# Patient Record
Sex: FEMALE | Race: WHITE | NOT HISPANIC OR LATINO | Employment: UNEMPLOYED | URBAN - METROPOLITAN AREA
[De-identification: names, ages, dates, MRNs, and addresses within clinical notes are randomized per-mention and may not be internally consistent; named-entity substitution may affect disease eponyms.]

---

## 2017-07-18 ENCOUNTER — HOSPITAL ENCOUNTER (EMERGENCY)
Facility: HOSPITAL | Age: 32
Discharge: HOME | End: 2017-07-18
Attending: FAMILY MEDICINE
Payer: MEDICAID

## 2017-07-18 VITALS
BODY MASS INDEX: 31.49 KG/M2 | RESPIRATION RATE: 18 BRPM | WEIGHT: 150 LBS | DIASTOLIC BLOOD PRESSURE: 91 MMHG | HEART RATE: 88 BPM | SYSTOLIC BLOOD PRESSURE: 127 MMHG | HEIGHT: 58 IN | TEMPERATURE: 98 F | OXYGEN SATURATION: 95 %

## 2017-07-18 DIAGNOSIS — G93.9 HEADACHE DUE TO INTRACRANIAL DISEASE: ICD-10-CM

## 2017-07-18 DIAGNOSIS — R51.9 HEADACHE DUE TO INTRACRANIAL DISEASE: ICD-10-CM

## 2017-07-18 DIAGNOSIS — T85.9XXA: ICD-10-CM

## 2017-07-18 PROCEDURE — 63600175 PHARM REV CODE 636 W HCPCS: Performed by: FAMILY MEDICINE

## 2017-07-18 PROCEDURE — 99284 EMERGENCY DEPT VISIT MOD MDM: CPT | Mod: ,,, | Performed by: FAMILY MEDICINE

## 2017-07-18 PROCEDURE — 99284 EMERGENCY DEPT VISIT MOD MDM: CPT | Mod: 25

## 2017-07-18 PROCEDURE — 96372 THER/PROPH/DIAG INJ SC/IM: CPT

## 2017-07-18 RX ORDER — PANTOPRAZOLE SODIUM 40 MG/1
40 TABLET, DELAYED RELEASE ORAL DAILY
COMMUNITY

## 2017-07-18 RX ORDER — HYDROMORPHONE HYDROCHLORIDE 1 MG/ML
2 INJECTION, SOLUTION INTRAMUSCULAR; INTRAVENOUS; SUBCUTANEOUS
Status: COMPLETED | OUTPATIENT
Start: 2017-07-18 | End: 2017-07-18

## 2017-07-18 RX ORDER — ONDANSETRON 2 MG/ML
4 INJECTION INTRAMUSCULAR; INTRAVENOUS
Status: COMPLETED | OUTPATIENT
Start: 2017-07-18 | End: 2017-07-18

## 2017-07-18 RX ORDER — ESCITALOPRAM OXALATE 20 MG/1
20 TABLET ORAL DAILY
COMMUNITY
End: 2021-06-09

## 2017-07-18 RX ORDER — BUTALBITAL, ACETAMINOPHEN AND CAFFEINE 50; 325; 40 MG/1; MG/1; MG/1
1-2 TABLET ORAL EVERY 6 HOURS PRN
Qty: 20 TABLET | Refills: 0 | Status: SHIPPED | OUTPATIENT
Start: 2017-07-18 | End: 2017-08-17

## 2017-07-18 RX ORDER — QUETIAPINE FUMARATE 25 MG/1
TABLET, FILM COATED ORAL DAILY
COMMUNITY
End: 2021-06-09

## 2017-07-18 RX ADMIN — HYDROMORPHONE HYDROCHLORIDE 2 MG: 1 INJECTION, SOLUTION INTRAMUSCULAR; INTRAVENOUS; SUBCUTANEOUS at 05:07

## 2017-07-18 RX ADMIN — ONDANSETRON 4 MG: 2 INJECTION INTRAMUSCULAR; INTRAVENOUS at 05:07

## 2017-07-18 NOTE — ED PROVIDER NOTES
Encounter Date: 7/18/2017    SCRIBE #1 NOTE: I, Priya Bingham, am scribing for, and in the presence of, Dr. Mehta .       History     Chief Complaint   Patient presents with    Shunt Problem     headache, nausea, think my  shunt is not working     This is a 32 y.o. Female with a PMHx of hydrocephalus, GERD and depression who presents to the ED with a chief complaint of shunt problems intermittently for approximately a week, worsened since yesterday. Patient has a  shunt and endorses headache, pain that is sensitive to the touch, nausea, loss of appetite, clammy hands, dizziness, sensitivity to light. Patient had a shunt revision several years ago and states that this episodes feels similar to how she felt before getting the revision.       The history is provided by the patient and medical records.     Review of patient's allergies indicates:   Allergen Reactions    Amoxicillin Nausea And Vomiting    Penicillins Nausea And Vomiting     Past Medical History:   Diagnosis Date    Depression     GERD (gastroesophageal reflux disease)     Hydrocephalus      Past Surgical History:   Procedure Laterality Date    BLADDER AUGMENTATION      CARDIAC SURGERY      CHOLECYSTECTOMY      JOINT REPLACEMENT      SHUNT REVISION       Family History   Problem Relation Age of Onset    No Known Problems Mother     No Known Problems Father      Social History   Substance Use Topics    Smoking status: Never Smoker    Smokeless tobacco: Never Used    Alcohol use No     Review of Systems   Constitutional: Positive for appetite change (Loss of appetite).   Eyes:        (+) Sensitivity to light   Gastrointestinal: Positive for nausea.   Neurological: Positive for dizziness and headaches.       Physical Exam     Initial Vitals [07/18/17 1244]   BP Pulse Resp Temp SpO2   129/88 90 18 98.8 °F (37.1 °C) 100 %      MAP       101.67         Physical Exam    Nursing note and vitals reviewed.  Constitutional:   Uncomfortable, but  cooperative with exam.    HENT:   Head: Atraumatic.   Tenderness at bubbles that she uses to monitor the shunt in right occipital area, no evidence of secondary infection.    Neck: Normal range of motion. Neck supple.   Cardiovascular: Normal rate, regular rhythm, normal heart sounds and intact distal pulses.   Pulmonary/Chest: Breath sounds normal. No respiratory distress. She has no wheezes. She has no rhonchi. She has no rales.   Abdominal: Soft. Bowel sounds are normal. She exhibits no distension. There is no tenderness.   Neurological: She is alert and oriented to person, place, and time. She has normal strength.   No acute focal deficits.         ED Course   Procedures  Labs Reviewed - No data to display          Medical Decision Making:   History:   Old Medical Records: I decided to obtain old medical records.  ED Management:  Head CT shows normal functioning of shunt.  Patient stable for discharge            Scribe Attestation:   Scribe #1: I performed the above scribed service and the documentation accurately describes the services I performed. I attest to the accuracy of the note.    Attending Attestation:           Physician Attestation for Scribe:  Physician Attestation Statement for Scribe #1: I, Dr. Mehta, reviewed documentation, as scribed by Priya Bingham  in my presence, and it is both accurate and complete.                 ED Course     Clinical Impression:   There were no encounter diagnoses.                           Yossi Mehta MD  07/20/17 2006

## 2017-07-18 NOTE — ED NOTES
"Spoke with Dr. Mehta about UPT test. Pt states," I am sure I am not pregnant." Dr. Mehta states if radiology does not have a problem then he has no problem not getting the UPT.   "

## 2017-07-18 NOTE — ED TRIAGE NOTES
Sent here by PCP bad headache, loss of appetite, sweaty palms and nausea since yesterday.  HX of  shunt.    GENERAL: The patient is a frail wheelchair bound female in no apparent distress. She is alert and oriented x3.    HEENT: Head is normocephalic and atraumatic. Extraocular muscles are intact. Pupils are equal, round, and reactive to light and accommodation. Nares appeared normal. Mouth is well hydrated and without lesions. Mucous membranes are moist. Posterior pharynx clear of any exudate or lesions.    NECK: Supple. No carotid bruits. No lymphadenopathy or thyromegaly.    LUNGS: Clear to auscultation.    HEART: Regular rate and rhythm without murmur.     ABDOMEN: Soft, nontender, and nondistended. Positive bowel sounds. No hepatosplenomegaly was noted.     EXTREMITIES: Without any cyanosis, clubbing, rash, lesions or edema.     NEUROLOGIC: Cranial nerves II through XII are grossly intact.     PSYCHIATRIC: Flat affect, but denies suicidal or homicidal ideations.    SKIN: No ulceration or induration present.

## 2017-11-29 ENCOUNTER — HOSPITAL ENCOUNTER (EMERGENCY)
Facility: HOSPITAL | Age: 32
Discharge: ANOTHER HOSPITAL | End: 2017-11-29
Attending: SURGERY
Payer: MEDICAID

## 2017-11-29 VITALS
SYSTOLIC BLOOD PRESSURE: 120 MMHG | HEART RATE: 88 BPM | DIASTOLIC BLOOD PRESSURE: 80 MMHG | TEMPERATURE: 98 F | WEIGHT: 155 LBS | RESPIRATION RATE: 18 BRPM | BODY MASS INDEX: 32.4 KG/M2

## 2017-11-29 DIAGNOSIS — S72.90XA FEMUR FRACTURE: ICD-10-CM

## 2017-11-29 PROCEDURE — 63600175 PHARM REV CODE 636 W HCPCS: Performed by: SURGERY

## 2017-11-29 PROCEDURE — 96375 TX/PRO/DX INJ NEW DRUG ADDON: CPT

## 2017-11-29 PROCEDURE — 96374 THER/PROPH/DIAG INJ IV PUSH: CPT

## 2017-11-29 PROCEDURE — 29505 APPLICATION LONG LEG SPLINT: CPT | Mod: LT

## 2017-11-29 PROCEDURE — 25000003 PHARM REV CODE 250: Performed by: SURGERY

## 2017-11-29 PROCEDURE — 99285 EMERGENCY DEPT VISIT HI MDM: CPT | Mod: 25

## 2017-11-29 RX ORDER — ONDANSETRON 2 MG/ML
4 INJECTION INTRAMUSCULAR; INTRAVENOUS
Status: COMPLETED | OUTPATIENT
Start: 2017-11-29 | End: 2017-11-29

## 2017-11-29 RX ORDER — HYDROMORPHONE HYDROCHLORIDE 2 MG/ML
2 INJECTION, SOLUTION INTRAMUSCULAR; INTRAVENOUS; SUBCUTANEOUS
Status: COMPLETED | OUTPATIENT
Start: 2017-11-29 | End: 2017-11-29

## 2017-11-29 RX ADMIN — HYDROMORPHONE HYDROCHLORIDE 2 MG: 2 INJECTION INTRAMUSCULAR; INTRAVENOUS; SUBCUTANEOUS at 04:11

## 2017-11-29 RX ADMIN — ONDANSETRON 4 MG: 2 INJECTION INTRAMUSCULAR; INTRAVENOUS at 04:11

## 2017-11-29 RX ADMIN — SODIUM CHLORIDE 500 ML: 0.9 INJECTION, SOLUTION INTRAVENOUS at 04:11

## 2017-11-29 NOTE — ED PROVIDER NOTES
[facility]                                     November 29, 2017                   Chief Complaint  32 y.o. female with Leg Pain (left upper)    History of Present Illness  Komal Georges presents to the emergency room with left leg pain this week  Patient has a fall last week and went to her PCP for x-rays today per history given  Pt on x-ray at the PCP as severe distal left femur fracture and possible patella fx  Patient has no obvious signs of compartment syndrome, good distal pulses noted  Patient came to the hospital at the advice of her PCP, needs trauma orthopedics    The history is provided by the patient  Medical history: Depression, GERD, hydrocephalus  Surgeries: Bladder augmentation, cardiac surgery, cholecystectomy, joint replacement, shunt revision  ALLERGIES: Amoxicillin and penicillin    Review of Systems and Physical Exam     Review of Systems  -- Constitution - no fever, denies fatigue, no weakness, no chills  -- Eyes - no tearing or redness, no visual disturbance  -- Ear, Nose - no tinnitus or earache, no nasal congestion or discharge  -- Mouth,Throat - no sore throat, no toothache, normal voice, normal swallowing  -- Respiratory - denies cough and congestion, no shortness of breath, no MCCARTHY  -- Cardiovascular - denies chest pain, no palpitations, denies claudication  -- Gastrointestinal - denies abdominal pain, nausea, vomiting, or diarrhea  -- Musculoskeletal - left knee pain after fall this week  -- Neurological - no headache, denies weakness or seizure; no LOC  -- Skin - denies pallor, rash, or changes in skin. no hives or welts noted    Vital Signs  -- Her blood pressure is 117/88 and her pulse is 98. Her respiration is 18.      Physical Exam  -- Nursing note and vitals reviewed  -- Head: Atraumatic. Normocephalic. No obvious abnormality  -- Eyes: Pupils are equal and reactive to light. Normal conjunctiva and lids  -- Cardiac: Normal rate, regular rhythm and normal  heart sounds  -- Pulmonary: Normal respiratory effort, breath sounds clear to auscultation  -- Abdominal: Soft, no tenderness. Normal bowel sounds. Normal liver edge  -- Musculoskeletal: Swelling and bruising to the left knee area with obvious fracture  -- Neurological: No focal deficits. Showed good interaction with staff  -- Vascular: Posterior tibial, dorsalis pedis and radial pulses 2+ bilaterally      Emergency Room Course     Treatment and Evaluation  -- 0.5 mg IV Dilaudid given in the ER  -- 4 mg IV Zofran given the ER  -- A knee immobilizer is placed on the affected knee     Left femur, 2 views  -- There is a comminuted fracture of the left distal femur.    -- No other fracture is seen.  Diffuse muscle atrophy is noted.    Diagnosis  -- The encounter diagnosis was Femur fracture.    Disposition and Plan  -- Disposition: transfer  -- Condition: stable  -- The patient needs a higher level of care  -- The patient is appropriate for transfer    This note is dictated on Dragon Natural Speaking word recognition program.  There are word recognition mistakes that are occasionally missed on review.           Anthony Meraz MD  11/29/17 3545

## 2017-11-29 NOTE — ED NOTES
Patient has been accepted at [facility]; ER to ER transfer.  Transport has been called. Patient has been informed.

## 2017-11-29 NOTE — ED TRIAGE NOTES
Patient reports that she was told by PMD that she has a broken leg and to report to [facility] for an eval but came here instead.

## 2018-09-28 ENCOUNTER — OFFICE VISIT (OUTPATIENT)
Dept: WOUND CARE | Facility: HOSPITAL | Age: 33
End: 2018-09-28
Attending: NURSE PRACTITIONER
Payer: MEDICAID

## 2018-09-28 VITALS
RESPIRATION RATE: 18 BRPM | SYSTOLIC BLOOD PRESSURE: 103 MMHG | TEMPERATURE: 98 F | DIASTOLIC BLOOD PRESSURE: 78 MMHG | HEART RATE: 67 BPM

## 2018-09-28 DIAGNOSIS — L89.613 STAGE III PRESSURE ULCER OF RIGHT HEEL: ICD-10-CM

## 2018-09-28 PROCEDURE — 11042 DBRDMT SUBQ TIS 1ST 20SQCM/<: CPT

## 2018-09-28 PROCEDURE — 27201912 HC WOUND CARE DEBRIDEMENT SUPPLIES

## 2018-09-28 PROCEDURE — 99211 OFF/OP EST MAY X REQ PHY/QHP: CPT | Mod: 25

## 2018-09-28 NOTE — PROGRESS NOTES
[facility]  Wound Care  Progress Note    Problem List Items Addressed This Visit        Derm    Stage III pressure ulcer of right heel    Overview     HPI: 33 yr old female with history of hydrocephalus, in a wheelchair. Developed a stage 3 pressure ulcer to right heel due to lack of sensation    Location: right heel    Duration: 9-14-18    Context: pressure    Associated Signs & Symptoms: denies pain    Timing: n/a    Severity: n/a    Quality: n/a    Modifying Factors: n/a                 Physical Exam   Constitutional: She is oriented to person, place, and time. She appears well-developed and well-nourished.   HENT:   Head: Normocephalic.   Pulmonary/Chest: Effort normal.   Musculoskeletal:   Pt in a wheelchair   Neurological: She is alert and oriented to person, place, and time.   Skin: Skin is warm and dry.   Pt has a stage 3 pressure ulcer to right heel with adipose and slough.   Psychiatric: She has a normal mood and affect. Her behavior is normal. Judgment and thought content normal.   Vitals reviewed.    Wound debrided, will use mepilex ag and will recheck in one week.  See wound doc progress notes. Documents will be scanned.        Hyacinth Aguero  [facility]

## 2018-10-26 ENCOUNTER — OFFICE VISIT (OUTPATIENT)
Dept: WOUND CARE | Facility: HOSPITAL | Age: 33
End: 2018-10-26
Attending: NURSE PRACTITIONER
Payer: MEDICAID

## 2018-10-26 VITALS
SYSTOLIC BLOOD PRESSURE: 109 MMHG | RESPIRATION RATE: 18 BRPM | TEMPERATURE: 98 F | HEART RATE: 77 BPM | DIASTOLIC BLOOD PRESSURE: 78 MMHG

## 2018-10-26 DIAGNOSIS — S90.414A ABRASION OF FOURTH TOE OF RIGHT FOOT, INITIAL ENCOUNTER: ICD-10-CM

## 2018-10-26 DIAGNOSIS — L89.613 STAGE III PRESSURE ULCER OF RIGHT HEEL: Primary | ICD-10-CM

## 2018-10-26 PROCEDURE — 27201912 HC WOUND CARE DEBRIDEMENT SUPPLIES

## 2018-10-26 PROCEDURE — 11042 DBRDMT SUBQ TIS 1ST 20SQCM/<: CPT

## 2018-10-26 NOTE — PROGRESS NOTES
[facility]  Wound Care  Progress Note    Problem List Items Addressed This Visit        Derm    Stage III pressure ulcer of right heel - Primary    Overview     HPI: 33 yr old female with history of hydrocephalus, in a wheelchair. Developed a stage 3 pressure ulcer to right heel due to lack of sensation    Location: right heel    Duration: 9-14-18    Context: pressure    Associated Signs & Symptoms: denies pain    Timing: n/a    Severity: n/a    Quality: n/a    Modifying Factors: n/a    10-26-18: Here for wound care to right medial heel and right 4th toe wound            Orthopedic    Abrasion of fourth toe of right foot      Physical Exam   Constitutional: She is oriented to person, place, and time. She appears well-developed and well-nourished.   HENT:   Head: Normocephalic.   Pulmonary/Chest: Effort normal.   Musculoskeletal:   Pt in a wheelchair   Neurological: She is alert and oriented to person, place, and time.   Does not feel her feet   Skin: Skin is warm and dry.   Right medial heel is a stage 3 pressure ulcer with adipose exposed. Right 4th toe started as an abrasion now with adipose exposed. Pt states it got caught on her wheelchair and pulled the skin off   Psychiatric: She has a normal mood and affect. Her behavior is normal. Judgment and thought content normal.   Vitals reviewed.  wounds debrided, will order xeroform gauze for right 4th toe and will continue mepilex ag to heel, will recheck in one week.      See wound doc progress notes. Documents will be scanned.        Hyacinth Aguero  [facility]

## 2018-11-09 ENCOUNTER — OFFICE VISIT (OUTPATIENT)
Dept: WOUND CARE | Facility: HOSPITAL | Age: 33
End: 2018-11-09
Attending: NURSE PRACTITIONER
Payer: MEDICAID

## 2018-11-09 DIAGNOSIS — L89.613 STAGE III PRESSURE ULCER OF RIGHT HEEL: Primary | ICD-10-CM

## 2018-11-09 PROCEDURE — 27201912 HC WOUND CARE DEBRIDEMENT SUPPLIES

## 2018-11-09 PROCEDURE — 11042 DBRDMT SUBQ TIS 1ST 20SQCM/<: CPT

## 2018-11-09 NOTE — PROGRESS NOTES
[facility]  Wound Care  Progress Note    Problem List Items Addressed This Visit        Derm    Stage III pressure ulcer of right heel - Primary    Overview     HPI: 33 yr old female with history of hydrocephalus, in a wheelchair. Developed a stage 3 pressure ulcer to right heel due to lack of sensation    Location: right heel    Duration: 9-14-18    Context: pressure    Associated Signs & Symptoms: denies pain    Timing: n/a    Severity: n/a    Quality: n/a    Modifying Factors: n/a    10-26-18: Here for wound care to right medial heel and right 4th toe wound  11-9-18: here for wound car to right medial heel and right 4th toe wounds             Physical Exam   Constitutional: She is oriented to person, place, and time. She appears well-developed and well-nourished.   HENT:   Head: Normocephalic.   Pulmonary/Chest: Effort normal.   Musculoskeletal:   Pt in a wheelchair   Neurological: She is alert and oriented to person, place, and time.   Skin: Skin is warm and dry.   Stage 3 to right medial heel with adipose exposed, right 4th toe wound is resolved.   Psychiatric: She has a normal mood and affect. Her behavior is normal. Judgment and thought content normal.   Vitals reviewed.  Pt was in the hospital last week, heel wound not taken care of properly and got larger, debrided today and will continue mepilex ag 3 times a week and will recheck in one week.    See wound doc progress notes. Documents will be scanned.        Hyacinth Aguero  [facility]

## 2018-11-16 ENCOUNTER — OFFICE VISIT (OUTPATIENT)
Dept: WOUND CARE | Facility: HOSPITAL | Age: 33
End: 2018-11-16
Attending: NURSE PRACTITIONER
Payer: MEDICAID

## 2018-11-16 VITALS — DIASTOLIC BLOOD PRESSURE: 87 MMHG | SYSTOLIC BLOOD PRESSURE: 121 MMHG

## 2018-11-16 DIAGNOSIS — L89.613 STAGE III PRESSURE ULCER OF RIGHT HEEL: Primary | ICD-10-CM

## 2018-11-16 PROCEDURE — 27201912 HC WOUND CARE DEBRIDEMENT SUPPLIES

## 2018-11-16 PROCEDURE — 11042 DBRDMT SUBQ TIS 1ST 20SQCM/<: CPT

## 2018-11-16 NOTE — PROGRESS NOTES
[facility]  Wound Care  Progress Note    Problem List Items Addressed This Visit        Derm    Stage III pressure ulcer of right heel - Primary    Overview     HPI: 33 yr old female with history of hydrocephalus, in a wheelchair. Developed a stage 3 pressure ulcer to right heel due to lack of sensation    Location: right heel    Duration: 9-14-18    Context: pressure    Associated Signs & Symptoms: denies pain    Timing: n/a    Severity: n/a    Quality: n/a    Modifying Factors: n/a    10-26-18: Here for wound care to right medial heel and right 4th toe wound  11-9-18: here for wound care to right medial heel and right 4th toe wounds  11-16-18: here for wound care for right medial heel wound             Physical Exam   Constitutional: She is oriented to person, place, and time. She appears well-developed and well-nourished.   HENT:   Head: Normocephalic.   Pulmonary/Chest: Effort normal.   Musculoskeletal:   Pt in a wheelchair   Neurological: She is alert and oriented to person, place, and time.   Skin: Skin is warm and dry.   Right medial heel with a healing stage 3 pressure ulcer with adipose exposed.   Psychiatric: She has a normal mood and affect. Her behavior is normal. Judgment and thought content normal.   Vitals reviewed.    Wound debrided, will continue mepilix ag qod, will recheck in 2 weeks.  See wound doc progress notes. Documents will be scanned.        Hyacinth Aguero  [facility]

## 2018-11-30 ENCOUNTER — OFFICE VISIT (OUTPATIENT)
Dept: WOUND CARE | Facility: HOSPITAL | Age: 33
End: 2018-11-30
Attending: NURSE PRACTITIONER
Payer: MEDICAID

## 2018-11-30 VITALS — SYSTOLIC BLOOD PRESSURE: 128 MMHG | RESPIRATION RATE: 18 BRPM | DIASTOLIC BLOOD PRESSURE: 66 MMHG | HEART RATE: 64 BPM

## 2018-11-30 DIAGNOSIS — L89.613 STAGE III PRESSURE ULCER OF RIGHT HEEL: Primary | ICD-10-CM

## 2018-11-30 PROCEDURE — 11042 DBRDMT SUBQ TIS 1ST 20SQCM/<: CPT

## 2018-11-30 PROCEDURE — 27201912 HC WOUND CARE DEBRIDEMENT SUPPLIES

## 2018-11-30 NOTE — PROGRESS NOTES
[facility]  Wound Care  Progress Note    Problem List Items Addressed This Visit        Derm    Stage III pressure ulcer of right heel - Primary    Overview     HPI: 33 yr old female with history of hydrocephalus, in a wheelchair. Developed a stage 3 pressure ulcer to right heel due to lack of sensation    Location: right heel    Duration: 9-14-18    Context: pressure    Associated Signs & Symptoms: denies pain    Timing: n/a    Severity: n/a    Quality: n/a    Modifying Factors: n/a    10-26-18: Here for wound care to right medial heel and right 4th toe wound  11-9-18: here for wound care to right medial heel and right 4th toe wounds  11-16-18: here for wound care for right medial heel wound  11-30-18: here for wound care for right medial heel pressure ulcer.             Physical Exam   Constitutional: She is oriented to person, place, and time. She appears well-developed and well-nourished.   HENT:   Head: Normocephalic.   Pulmonary/Chest: Effort normal.   Musculoskeletal:   Pt in a wheelchair   Neurological: She is alert and oriented to person, place, and time.   Skin: Skin is warm and dry.   Stage 3 pressure ulcer to right medial heel with adipose and small amount of slough.   Psychiatric: She has a normal mood and affect. Her behavior is normal. Judgment and thought content normal.   Vitals reviewed.    Wound debrided and will continue mepilex ag, wound is getting smaller, will recheck in one week.  See wound doc progress notes. Documents will be scanned.        Hyacinth Aguero  [facility]

## 2018-12-07 ENCOUNTER — OFFICE VISIT (OUTPATIENT)
Dept: WOUND CARE | Facility: HOSPITAL | Age: 33
End: 2018-12-07
Attending: NURSE PRACTITIONER
Payer: MEDICAID

## 2018-12-07 VITALS
SYSTOLIC BLOOD PRESSURE: 132 MMHG | RESPIRATION RATE: 18 BRPM | HEART RATE: 86 BPM | DIASTOLIC BLOOD PRESSURE: 86 MMHG | TEMPERATURE: 98 F

## 2018-12-07 DIAGNOSIS — L89.613 STAGE III PRESSURE ULCER OF RIGHT HEEL: Primary | ICD-10-CM

## 2018-12-07 PROCEDURE — 27201912 HC WOUND CARE DEBRIDEMENT SUPPLIES

## 2018-12-07 PROCEDURE — 11042 DBRDMT SUBQ TIS 1ST 20SQCM/<: CPT

## 2018-12-07 NOTE — PROGRESS NOTES
[facility]  Wound Care  Progress Note    Problem List Items Addressed This Visit        Derm    Stage III pressure ulcer of right heel - Primary    Overview     HPI: 33 yr old female with history of hydrocephalus, in a wheelchair. Developed a stage 3 pressure ulcer to right heel due to lack of sensation    Location: right heel    Duration: 9-14-18    Context: pressure    Associated Signs & Symptoms: denies pain    Timing: n/a    Severity: n/a    Quality: n/a    Modifying Factors: n/a    10-26-18: Here for wound care to right medial heel and right 4th toe wound  11-9-18: here for wound care to right medial heel and right 4th toe wounds  11-16-18: here for wound care for right medial heel wound  11-30-18: here for wound care for right medial heel pressure ulcer.  12-7-18: here for wound care for right medial heel pressure ulcer.             Physical Exam   Constitutional: She is oriented to person, place, and time. She appears well-developed and well-nourished.   HENT:   Head: Normocephalic.   Musculoskeletal:   Pt in a wheelchair   Neurological: She is alert and oriented to person, place, and time.   Skin: Skin is warm and dry.   Stage 3 pressure ulcer to right medial heel with adipose exposed.   Psychiatric: She has a normal mood and affect. Her behavior is normal. Judgment and thought content normal.   Vitals reviewed.    Wound debrided and will continue mepilex ag and will recheck in one week.  See wound doc progress notes. Documents will be scanned.        Hyacinth Aguero  [facility]

## 2018-12-14 ENCOUNTER — OFFICE VISIT (OUTPATIENT)
Dept: WOUND CARE | Facility: HOSPITAL | Age: 33
End: 2018-12-14
Attending: NURSE PRACTITIONER
Payer: MEDICAID

## 2018-12-14 VITALS — SYSTOLIC BLOOD PRESSURE: 110 MMHG | DIASTOLIC BLOOD PRESSURE: 80 MMHG | RESPIRATION RATE: 18 BRPM | HEART RATE: 74 BPM

## 2018-12-14 DIAGNOSIS — L89.613 STAGE III PRESSURE ULCER OF RIGHT HEEL: Primary | ICD-10-CM

## 2018-12-14 PROCEDURE — 11042 DBRDMT SUBQ TIS 1ST 20SQCM/<: CPT

## 2018-12-14 PROCEDURE — 27201912 HC WOUND CARE DEBRIDEMENT SUPPLIES

## 2018-12-14 NOTE — PROGRESS NOTES
[facility]  Wound Care  Progress Note    Problem List Items Addressed This Visit        Derm    Stage III pressure ulcer of right heel - Primary    Overview     HPI: 33 yr old female with history of hydrocephalus, in a wheelchair. Developed a stage 3 pressure ulcer to right heel due to lack of sensation    Location: right heel    Duration: 9-14-18    Context: pressure    Associated Signs & Symptoms: denies pain    Timing: n/a    Severity: n/a    Quality: n/a    Modifying Factors: n/a    10-26-18: Here for wound care to right medial heel and right 4th toe wound  11-9-18: here for wound care to right medial heel and right 4th toe wounds  11-16-18: here for wound care for right medial heel wound  11-30-18: here for wound care for right medial heel pressure ulcer.  12-7-18: here for wound care for right medial heel pressure ulcer.  12-14-18: here for wound care to right medial heel           Physical Exam   Constitutional: She is oriented to person, place, and time. She appears well-developed and well-nourished.   HENT:   Head: Normocephalic.   Pulmonary/Chest: Effort normal.   Musculoskeletal:   Pt in a wheelchair   Neurological: She is alert and oriented to person, place, and time.   Skin: Skin is warm and dry.   Stage 3 pressure ulcer to right medial heel with adipose and slough   Psychiatric: She has a normal mood and affect. Her behavior is normal. Judgment and thought content normal.   Vitals reviewed.      Wound debrided of small amount of slough, will continue mepilex ag with gentian violet to jose wound, will recheck in one week.  See wound doc progress notes. Documents will be scanned.        Hyacinth Aguero  [facility]

## 2018-12-21 ENCOUNTER — OFFICE VISIT (OUTPATIENT)
Dept: WOUND CARE | Facility: HOSPITAL | Age: 33
End: 2018-12-21
Attending: NURSE PRACTITIONER
Payer: MEDICAID

## 2018-12-21 VITALS
RESPIRATION RATE: 18 BRPM | TEMPERATURE: 99 F | HEART RATE: 85 BPM | SYSTOLIC BLOOD PRESSURE: 103 MMHG | DIASTOLIC BLOOD PRESSURE: 73 MMHG

## 2018-12-21 DIAGNOSIS — Q05.7 SPINA BIFIDA OF LUMBAR REGION WITHOUT HYDROCEPHALUS: ICD-10-CM

## 2018-12-21 DIAGNOSIS — L89.613 STAGE III PRESSURE ULCER OF RIGHT HEEL: Primary | ICD-10-CM

## 2018-12-21 PROCEDURE — 11042 DBRDMT SUBQ TIS 1ST 20SQCM/<: CPT

## 2018-12-21 PROCEDURE — 27201912 HC WOUND CARE DEBRIDEMENT SUPPLIES

## 2018-12-21 NOTE — PROGRESS NOTES
[facility]  Wound Care  Progress Note    Problem List Items Addressed This Visit        Derm    Stage III pressure ulcer of right heel - Primary    Overview     HPI: 33 yr old female with history of hydrocephalus, in a wheelchair. Developed a stage 3 pressure ulcer to right heel due to lack of sensation    Location: right heel    Duration: 9-14-18    Context: pressure    Associated Signs & Symptoms: denies pain    Timing: n/a    Severity: n/a    Quality: n/a    Modifying Factors: n/a    10-26-18: Here for wound care to right medial heel and right 4th toe wound  11-9-18: here for wound care to right medial heel and right 4th toe wounds  11-16-18: here for wound care for right medial heel wound  11-30-18: here for wound care for right medial heel pressure ulcer.  12-7-18: here for wound care for right medial heel pressure ulcer.  12-14-18: here for wound care to right medial heel  12-21-18:  Here for wound care to right medial heel.             Physical Exam   Constitutional: She is oriented to person, place, and time. She appears well-developed.   HENT:   Head: Normocephalic.   Pulmonary/Chest: Effort normal.   Musculoskeletal: Normal range of motion.   Neurological: She is alert and oriented to person, place, and time.   Skin: Skin is warm and dry.   Pressure ulcer to right medial heel stage 3 with adipose exposed.   Psychiatric: She has a normal mood and affect. Her behavior is normal. Judgment and thought content normal.     Wound debrided, will continue silver alginate and will recheck in one week.  See wound doc progress notes. Documents will be scanned.        Haycinth Aguero  [facility]

## 2018-12-28 ENCOUNTER — OFFICE VISIT (OUTPATIENT)
Dept: WOUND CARE | Facility: HOSPITAL | Age: 33
End: 2018-12-28
Attending: NURSE PRACTITIONER
Payer: MEDICAID

## 2018-12-28 VITALS
SYSTOLIC BLOOD PRESSURE: 123 MMHG | HEART RATE: 75 BPM | RESPIRATION RATE: 18 BRPM | DIASTOLIC BLOOD PRESSURE: 84 MMHG | TEMPERATURE: 98 F

## 2018-12-28 DIAGNOSIS — L89.613 STAGE III PRESSURE ULCER OF RIGHT HEEL: Primary | ICD-10-CM

## 2018-12-28 PROCEDURE — 27201912 HC WOUND CARE DEBRIDEMENT SUPPLIES

## 2018-12-28 PROCEDURE — 11042 DBRDMT SUBQ TIS 1ST 20SQCM/<: CPT

## 2018-12-28 NOTE — PROGRESS NOTES
[facility]  Wound Care  Progress Note    Problem List Items Addressed This Visit        Derm    Stage III pressure ulcer of right heel - Primary    Overview     HPI: 33 yr old female with history of hydrocephalus, in a wheelchair. Developed a stage 3 pressure ulcer to right heel due to lack of sensation    Location: right heel    Duration: 9-14-18    Context: pressure    Associated Signs & Symptoms: denies pain    Timing: n/a    Severity: n/a    Quality: n/a    Modifying Factors: n/a    10-26-18: Here for wound care to right medial heel and right 4th toe wound  11-9-18: here for wound care to right medial heel and right 4th toe wounds  11-16-18: here for wound care for right medial heel wound  11-30-18: here for wound care for right medial heel pressure ulcer.  12-7-18: here for wound care for right medial heel pressure ulcer.  12-14-18: here for wound care to right medial heel  12-21-18:  Here for wound care to right medial heel.  12-28-18: here for wound care to right medial heel pressure ulcer             Physical Exam   Constitutional: She is oriented to person, place, and time. She appears well-developed and well-nourished.   HENT:   Head: Normocephalic.   Pulmonary/Chest: Effort normal.   Musculoskeletal:   Pt in a wheelchair due to spina bifida   Neurological: She is alert and oriented to person, place, and time.   Skin: Skin is warm and dry.   Stage 3 pressure ulcer to right medial heel with adipose and slough   Psychiatric: She has a normal mood and affect. Her behavior is normal. Judgment and thought content normal.   Vitals reviewed.    Wound debrided and will continue mepilex ag and will recheck in one week.  See wound doc progress notes. Documents will be scanned.        Hyacinth Aguero  [facility]

## 2019-01-04 ENCOUNTER — OFFICE VISIT (OUTPATIENT)
Dept: WOUND CARE | Facility: HOSPITAL | Age: 34
End: 2019-01-04
Attending: NURSE PRACTITIONER
Payer: MEDICAID

## 2019-01-04 VITALS
TEMPERATURE: 98 F | RESPIRATION RATE: 18 BRPM | SYSTOLIC BLOOD PRESSURE: 109 MMHG | DIASTOLIC BLOOD PRESSURE: 83 MMHG | HEART RATE: 89 BPM

## 2019-01-04 DIAGNOSIS — L89.613 STAGE III PRESSURE ULCER OF RIGHT HEEL: Primary | ICD-10-CM

## 2019-01-04 PROCEDURE — 27201912 HC WOUND CARE DEBRIDEMENT SUPPLIES

## 2019-01-04 PROCEDURE — 87186 SC STD MICRODIL/AGAR DIL: CPT

## 2019-01-04 PROCEDURE — 87070 CULTURE OTHR SPECIMN AEROBIC: CPT

## 2019-01-04 PROCEDURE — 11042 DBRDMT SUBQ TIS 1ST 20SQCM/<: CPT

## 2019-01-04 PROCEDURE — 87205 SMEAR GRAM STAIN: CPT

## 2019-01-04 PROCEDURE — 87077 CULTURE AEROBIC IDENTIFY: CPT

## 2019-01-04 NOTE — PROGRESS NOTES
[facility]  Wound Care  Progress Note    Problem List Items Addressed This Visit        Derm    Stage III pressure ulcer of right heel - Primary    Overview     HPI: 33 yr old female with history of hydrocephalus, in a wheelchair. Developed a stage 3 pressure ulcer to right heel due to lack of sensation    Location: right heel    Duration: 9-14-18    Context: pressure    Associated Signs & Symptoms: denies pain    Timing: n/a    Severity: n/a    Quality: n/a    Modifying Factors: n/a    10-26-18: Here for wound care to right medial heel and right 4th toe wound  11-9-18: here for wound care to right medial heel and right 4th toe wounds  11-16-18: here for wound care for right medial heel wound  11-30-18: here for wound care for right medial heel pressure ulcer.  12-7-18: here for wound care for right medial heel pressure ulcer.  12-14-18: here for wound care to right medial heel  12-21-18:  Here for wound care to right medial heel.  12-28-18: here for wound care to right medial heel pressure ulcer  1-4-19: here for wound care to right medial heel pressure ulcer             Physical Exam   Constitutional: She is oriented to person, place, and time. She appears well-developed and well-nourished.   HENT:   Head: Normocephalic.   Pulmonary/Chest: Effort normal.   Musculoskeletal:   Pt has spina bifida and is paralyzed from the waist down.   Neurological: She is alert and oriented to person, place, and time.   Skin: Skin is warm and dry.   Stage 3 pressure ulcer to right medial heel with adipose exposed. Johanny wound is macerated.   Psychiatric: She has a normal mood and affect. Her behavior is normal. Judgment and thought content normal.   Vitals reviewed.    Wound is larger, heavy drainage from wound, culture obtained, will change to silver alginate qod, pt to change outer dressing daily if soiled. Will recheck in one week.  See wound doc progress notes. Documents will be scanned.        Hyacinth  LeBoeuf [facility]

## 2019-01-07 LAB
BACTERIA THROAT CULT: NORMAL
BACTERIA THROAT CULT: NORMAL
GRAM STN SPEC: NORMAL
GRAM STN SPEC: NORMAL

## 2019-01-11 ENCOUNTER — OFFICE VISIT (OUTPATIENT)
Dept: WOUND CARE | Facility: HOSPITAL | Age: 34
End: 2019-01-11
Attending: NURSE PRACTITIONER
Payer: MEDICAID

## 2019-01-11 VITALS — RESPIRATION RATE: 20 BRPM | SYSTOLIC BLOOD PRESSURE: 121 MMHG | DIASTOLIC BLOOD PRESSURE: 84 MMHG | HEART RATE: 80 BPM

## 2019-01-11 DIAGNOSIS — L97.521 NON-PRESSURE CHRONIC ULCER OF OTHER PART OF LEFT FOOT LIMITED TO BREAKDOWN OF SKIN: ICD-10-CM

## 2019-01-11 DIAGNOSIS — L89.613 STAGE III PRESSURE ULCER OF RIGHT HEEL: Primary | ICD-10-CM

## 2019-01-11 DIAGNOSIS — G60.9 HEREDITARY AND IDIOPATHIC NEUROPATHY, UNSPECIFIED: ICD-10-CM

## 2019-01-11 PROCEDURE — 11042 DBRDMT SUBQ TIS 1ST 20SQCM/<: CPT

## 2019-01-11 PROCEDURE — 27201912 HC WOUND CARE DEBRIDEMENT SUPPLIES

## 2019-01-11 NOTE — PROGRESS NOTES
[facility]  Wound Care  Progress Note    Problem List Items Addressed This Visit        Neuro    Hereditary and idiopathic neuropathy, unspecified       Derm    Stage III pressure ulcer of right heel - Primary    Overview     HPI: 33 yr old female with history of hydrocephalus, in a wheelchair. Developed a stage 3 pressure ulcer to right heel due to lack of sensation    Location: right heel and right great toe    Duration: 9-14-18 12-10-18    Context: heel is  Pressure and toe is neuropathic    Associated Signs & Symptoms: denies pain    Timing: n/a    Severity: n/a    Quality: n/a    Modifying Factors: n/a    10-26-18: Here for wound care to right medial heel and right 4th toe wound  11-9-18: here for wound care to right medial heel and right 4th toe wounds  11-16-18: here for wound care for right medial heel wound  11-30-18: here for wound care for right medial heel pressure ulcer.  12-7-18: here for wound care for right medial heel pressure ulcer.  12-14-18: here for wound care to right medial heel  12-21-18:  Here for wound care to right medial heel.  12-28-18: here for wound care to right medial heel pressure ulcer  1-4-19: here for wound care to right medial heel pressure ulcer  1-11-19: here for wound care to right medial heel pressure ulcer and a neuropathic ulcer to her left great toe that has been there for over a month.         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 She appears well-developed and well-nourished.   HENT:   Head: Normocephalic.   Pulmonary/Chest: Effort normal.   Musculoskeletal:   Pt has spina bifida and is paralyzed from the waist down. Pt is in a wheelchair    Neurological: She is alert and oriented to person, place, and time.   No feeling from the waist down.   Skin: Skin is warm and dry.   Stage 3 pressure ulcer to her right medial heel with  adipose exposed and right great toe is a neuropathic, chronic ulcer limited to skin breakdown.   Psychiatric: She has a normal mood and affect. Her behavior is normal. Judgment and thought content normal.   Vitals reviewed.    See wound doc progress notes. Documents will be scanned.  Culture results noted and will use topical abx to wounds, will recheck in one week, pt instructed to bath with hibiclens daily for one week and then weekly until bottle is empty.       Hyacinth CastleBoeuf  [facility]

## 2019-01-18 ENCOUNTER — OFFICE VISIT (OUTPATIENT)
Dept: WOUND CARE | Facility: HOSPITAL | Age: 34
End: 2019-01-18
Attending: NURSE PRACTITIONER
Payer: MEDICAID

## 2019-01-18 VITALS — HEART RATE: 79 BPM | SYSTOLIC BLOOD PRESSURE: 127 MMHG | RESPIRATION RATE: 18 BRPM | DIASTOLIC BLOOD PRESSURE: 68 MMHG

## 2019-01-18 DIAGNOSIS — Q05.7 SPINA BIFIDA OF LUMBAR REGION WITHOUT HYDROCEPHALUS: Primary | ICD-10-CM

## 2019-01-18 DIAGNOSIS — L97.511 NON-PRESSURE CHRONIC ULCER OF OTHER PART OF RIGHT FOOT LIMITED TO BREAKDOWN OF SKIN: ICD-10-CM

## 2019-01-18 DIAGNOSIS — L89.613 STAGE III PRESSURE ULCER OF RIGHT HEEL: ICD-10-CM

## 2019-01-18 DIAGNOSIS — G60.9 HEREDITARY AND IDIOPATHIC NEUROPATHY, UNSPECIFIED: ICD-10-CM

## 2019-01-18 PROCEDURE — 99214 OFFICE O/P EST MOD 30 MIN: CPT

## 2019-01-18 NOTE — PROGRESS NOTES
[facility]  Wound Care  Progress Note    Problem List Items Addressed This Visit        Neuro    Spina bifida of lumbar region without hydrocephalus - Primary       Derm    Stage III pressure ulcer of right heel    Overview     HPI: 34 yr old female with history of hydrocephalus, in a wheelchair. Developed a stage 3 pressure ulcer to right heel due to lack of sensation    Location: right heel and right great toe    Duration: 9-14-18 12-10-18    Context: heel is  Pressure and toe is neuropathic    Associated Signs & Symptoms: denies pain    Timing: n/a    Severity: n/a    Quality: n/a    Modifying Factors: n/a    10-26-18: Here for wound care to right medial heel and right 4th toe wound  11-9-18: here for wound care to right medial heel and right 4th toe wounds  11-16-18: here for wound care for right medial heel wound  11-30-18: here for wound care for right medial heel pressure ulcer.  12-7-18: here for wound care for right medial heel pressure ulcer.  12-14-18: here for wound care to right medial heel  12-21-18:  Here for wound care to right medial heel.  12-28-18: here for wound care to right medial heel pressure ulcer  1-4-19: here for wound care to right medial heel pressure ulcer  1-11-19: here for wound care to right medial heel pressure ulcer and a neuropathic ulcer to her left great toe that has been there for over a month.  1-18-19: here for wound care to right medial pressure ulcer and left great toe neuropathic ulcer           Physical Exam   Constitutional: She is oriented to person, place, and time. She appears well-developed and well-nourished.   HENT:   Head: Normocephalic.   Pulmonary/Chest: Effort normal.   Musculoskeletal:   Pt paralyzed from the waist down due to spina bifida, in a wheelchair   Neurological: She is alert and oriented to person, place, and time.   Skin: Skin is warm and dry.   Stage 3 pressure ulcer to her right heel with mostly epithelia tissue and small  amount of granulation, right great toe is a neuropathic ulcer that is superficial with only skin breakdown.   Psychiatric: She has a normal mood and affect. Her behavior is normal. Judgment and thought content normal.   Vitals reviewed.    Pressure ulcer to right heel is doing well and healing, right great toe is superficial, will continue current treatment of silver alginate to toe and topical abx to heel, 2 sutures removed from pt's head...... Pt's wheelchair tipped over last Friday and she went to the ER for 2 stitches. Will recheck in one week.    See wound doc progress notes. Documents will be scanned.        Hyacinth Aguero  [facility]

## 2019-01-25 ENCOUNTER — OFFICE VISIT (OUTPATIENT)
Dept: WOUND CARE | Facility: HOSPITAL | Age: 34
End: 2019-01-25
Attending: NURSE PRACTITIONER
Payer: MEDICAID

## 2019-01-25 VITALS — RESPIRATION RATE: 18 BRPM | SYSTOLIC BLOOD PRESSURE: 121 MMHG | DIASTOLIC BLOOD PRESSURE: 93 MMHG | HEART RATE: 72 BPM

## 2019-01-25 DIAGNOSIS — Q05.2 SPINA BIFIDA WITH HYDROCEPHALUS, LUMBAR REGION: ICD-10-CM

## 2019-01-25 DIAGNOSIS — G82.20 PARAPLEGIA: ICD-10-CM

## 2019-01-25 DIAGNOSIS — L97.511 NON-PRESSURE CHRONIC ULCER OF OTHER PART OF RIGHT FOOT LIMITED TO BREAKDOWN OF SKIN: ICD-10-CM

## 2019-01-25 DIAGNOSIS — Q05.7 SPINA BIFIDA OF LUMBAR REGION WITHOUT HYDROCEPHALUS: Primary | ICD-10-CM

## 2019-01-25 DIAGNOSIS — L89.613 STAGE III PRESSURE ULCER OF RIGHT HEEL: ICD-10-CM

## 2019-01-25 PROCEDURE — 99213 OFFICE O/P EST LOW 20 MIN: CPT

## 2019-01-25 NOTE — PROGRESS NOTES
[facility]  Wound Care  Progress Note    Problem List Items Addressed This Visit        Neuro    Spina bifida with hydrocephalus, lumbar region    Paraplegia    RESOLVED: Spina bifida of lumbar region without hydrocephalus - Primary       Derm    Stage III pressure ulcer of right heel    Overview     HPI: 34 yr old female with history of hydrocephalus, in a wheelchair. Developed a stage 3 pressure ulcer to right heel due to lack of sensation    Location: right heel and right great toe    Duration: 9-14-18 12-10-18    Context: heel is  Pressure and toe is neuropathic    Associated Signs & Symptoms: denies pain    Timing: n/a    Severity: n/a    Quality: n/a    Modifying Factors: n/a    10-26-18: Here for wound care to right medial heel and right 4th toe wound  11-9-18: here for wound care to right medial heel and right 4th toe wounds  11-16-18: here for wound care for right medial heel wound  11-30-18: here for wound care for right medial heel pressure ulcer.  12-7-18: here for wound care for right medial heel pressure ulcer.  12-14-18: here for wound care to right medial heel  12-21-18:  Here for wound care to right medial heel.  12-28-18: here for wound care to right medial heel pressure ulcer  1-4-19: here for wound care to right medial heel pressure ulcer  1-11-19: here for wound care to right medial heel pressure ulcer and a neuropathic ulcer to her right great toe that has been there for over a month.  1-18-19: here for wound care to right medial pressure ulcer and right great toe neuropathic ulcer  1-25-19: here for wound care for right medial heel stage 3 pressure ulcer and right great toe neuropathic ulcer         Non-pressure chronic ulcer of other part of right foot limited to breakdown of skin        Physical Exam   Constitutional: She is oriented to person, place, and time. She appears well-developed and well-nourished.   HENT:   Head: Normocephalic.   Pulmonary/Chest: Effort normal.    Musculoskeletal: Normal range of motion.   Neurological: She is alert and oriented to person, place, and time.   Skin: Skin is warm and dry.   Pressure ulcer stage 3 to right heel with adipose exposed, right great toe is a neuropathic ulcer that is superficial today   Psychiatric: She has a normal mood and affect. Her behavior is normal. Judgment and thought content normal.   Vitals reviewed.    Will continue topical abx to heel and gentian violet to right great toe, will recheck in one week.  See wound doc progress notes. Documents will be scanned.        Hyacinth CastleBoeuf  [facility]

## 2019-02-01 ENCOUNTER — OFFICE VISIT (OUTPATIENT)
Dept: WOUND CARE | Facility: HOSPITAL | Age: 34
End: 2019-02-01
Attending: NURSE PRACTITIONER
Payer: MEDICAID

## 2019-02-01 VITALS
SYSTOLIC BLOOD PRESSURE: 127 MMHG | TEMPERATURE: 98 F | HEART RATE: 74 BPM | RESPIRATION RATE: 18 BRPM | DIASTOLIC BLOOD PRESSURE: 84 MMHG

## 2019-02-01 DIAGNOSIS — L89.613 STAGE III PRESSURE ULCER OF RIGHT HEEL: Primary | ICD-10-CM

## 2019-02-01 PROCEDURE — 11042 DBRDMT SUBQ TIS 1ST 20SQCM/<: CPT

## 2019-02-01 PROCEDURE — 27201912 HC WOUND CARE DEBRIDEMENT SUPPLIES

## 2019-02-01 NOTE — PROGRESS NOTES
[facility]  Wound Care  Progress Note    Problem List Items Addressed This Visit        Derm    Stage III pressure ulcer of right heel - Primary    Overview     HPI: 34 yr old female with history of hydrocephalus, in a wheelchair. Developed a stage 3 pressure ulcer to right heel due to lack of sensation    Location: right heel and right great toe    Duration: 9-14-18 12-10-18    Context: heel is  Pressure and toe is neuropathic    Associated Signs & Symptoms: denies pain    Timing: n/a    Severity: n/a    Quality: n/a    Modifying Factors: n/a    10-26-18: Here for wound care to right medial heel and right 4th toe wound  11-9-18: here for wound care to right medial heel and right 4th toe wounds  11-16-18: here for wound care for right medial heel wound  11-30-18: here for wound care for right medial heel pressure ulcer.  12-7-18: here for wound care for right medial heel pressure ulcer.  12-14-18: here for wound care to right medial heel  12-21-18:  Here for wound care to right medial heel.  12-28-18: here for wound care to right medial heel pressure ulcer  1-4-19: here for wound care to right medial heel pressure ulcer  1-11-19: here for wound care to right medial heel pressure ulcer and a neuropathic ulcer to her right great toe that has been there for over a month.  1-18-19: here for wound care to right medial pressure ulcer and right great toe neuropathic ulcer  1-25-19: here for wound care for right medial heel stage 3 pressure ulcer and right great toe neuropathic ulcer  2-1-19: here for wound care for right medial heel stage 3 pressure ulcer and neuropathic ulcer to right great toe.           Physical Exam   Constitutional: She is oriented to person, place, and time. She appears well-developed and well-nourished.   HENT:   Head: Normocephalic.   Musculoskeletal:   Pt in a wheelchair due to spina bifida   Neurological: She is alert and oriented to person, place, and time.   Skin: Skin is  warm and dry.   Stage 3 pressure ulcer to right medial heel with adipose exposed. Neuropathic ulcer to right great toe is resolved.   Psychiatric: She has a normal mood and affect. Her behavior is normal. Judgment and thought content normal.   Vitals reviewed.      Heel ulcer debrided, will continue topical abx, pt keeps knocking her foot on her wheelchair. Will recheck in one week.  See wound doc progress notes. Documents will be scanned.        Hyacinth LeBoeuf [facility]

## 2019-02-08 ENCOUNTER — OFFICE VISIT (OUTPATIENT)
Dept: WOUND CARE | Facility: HOSPITAL | Age: 34
End: 2019-02-08
Attending: NURSE PRACTITIONER
Payer: MEDICAID

## 2019-02-08 VITALS
TEMPERATURE: 98 F | SYSTOLIC BLOOD PRESSURE: 129 MMHG | RESPIRATION RATE: 18 BRPM | HEART RATE: 75 BPM | DIASTOLIC BLOOD PRESSURE: 80 MMHG

## 2019-02-08 DIAGNOSIS — L89.613 STAGE III PRESSURE ULCER OF RIGHT HEEL: ICD-10-CM

## 2019-02-08 DIAGNOSIS — G60.9 HEREDITARY AND IDIOPATHIC NEUROPATHY, UNSPECIFIED: Primary | ICD-10-CM

## 2019-02-08 DIAGNOSIS — Q05.2 SPINA BIFIDA WITH HYDROCEPHALUS, LUMBAR REGION: ICD-10-CM

## 2019-02-08 PROCEDURE — 11042 DBRDMT SUBQ TIS 1ST 20SQCM/<: CPT

## 2019-02-08 PROCEDURE — 27201912 HC WOUND CARE DEBRIDEMENT SUPPLIES

## 2019-02-08 NOTE — PROGRESS NOTES
[facility]  Wound Care  Progress Note    Problem List Items Addressed This Visit        Neuro    Hereditary and idiopathic neuropathy, unspecified - Primary    Spina bifida with hydrocephalus, lumbar region       Derm    Stage III pressure ulcer of right heel    Overview     HPI: 34 yr old female with history of hydrocephalus, in a wheelchair. Developed a stage 3 pressure ulcer to right heel due to lack of sensation    Location: right heel and right great toe    Duration: 9-14-18 12-10-18    Context: heel is  Pressure and toe is neuropathic    Associated Signs & Symptoms: denies pain    Timing: n/a    Severity: n/a    Quality: n/a    Modifying Factors: n/a    10-26-18: Here for wound care to right medial heel and right 4th toe wound  11-9-18: here for wound care to right medial heel and right 4th toe wounds  11-16-18: here for wound care for right medial heel wound  11-30-18: here for wound care for right medial heel pressure ulcer.  12-7-18: here for wound care for right medial heel pressure ulcer.  12-14-18: here for wound care to right medial heel  12-21-18:  Here for wound care to right medial heel.  12-28-18: here for wound care to right medial heel pressure ulcer  1-4-19: here for wound care to right medial heel pressure ulcer  1-11-19: here for wound care to right medial heel pressure ulcer and a neuropathic ulcer to her right great toe that has been there for over a month.  1-18-19: here for wound care to right medial pressure ulcer and right great toe neuropathic ulcer  1-25-19: here for wound care for right medial heel stage 3 pressure ulcer and right great toe neuropathic ulcer  2-1-19: here for wound care for right medial heel stage 3 pressure ulcer and neuropathic ulcer to right great toe.  2-8-19: here for wound care for right medial heel stage 3 pressure ulcer             Physical Exam   Constitutional: She is oriented to person, place, and time. She appears well-developed and  well-nourished.   HENT:   Head: Normocephalic.   Pulmonary/Chest: Effort normal.   Musculoskeletal:   Pt is paralyzed from the waist down due to spina bifida   Neurological: She is alert and oriented to person, place, and time.   Skin: Skin is warm and dry.   Stage 3 pressure ulcer to right heel with adipose exposed.   Psychiatric: She has a normal mood and affect. Her behavior is normal. Judgment and thought content normal.   Vitals reviewed.    Pressure ulcer debrided, looks better, will continue topical abx daily, will recheck in one week.  See wound doc progress notes. Documents will be scanned.        Hyacinth CastleBoeuf  [facility]

## 2019-02-15 ENCOUNTER — OFFICE VISIT (OUTPATIENT)
Dept: WOUND CARE | Facility: HOSPITAL | Age: 34
End: 2019-02-15
Attending: NURSE PRACTITIONER
Payer: MEDICAID

## 2019-02-15 VITALS — HEART RATE: 62 BPM | RESPIRATION RATE: 18 BRPM | SYSTOLIC BLOOD PRESSURE: 100 MMHG | DIASTOLIC BLOOD PRESSURE: 74 MMHG

## 2019-02-15 DIAGNOSIS — L89.613 STAGE III PRESSURE ULCER OF RIGHT HEEL: Primary | ICD-10-CM

## 2019-02-15 DIAGNOSIS — Q05.2 SPINA BIFIDA WITH HYDROCEPHALUS, LUMBAR REGION: ICD-10-CM

## 2019-02-15 PROCEDURE — 27201912 HC WOUND CARE DEBRIDEMENT SUPPLIES

## 2019-02-15 PROCEDURE — 11042 DBRDMT SUBQ TIS 1ST 20SQCM/<: CPT

## 2019-02-15 NOTE — PROGRESS NOTES
[facility]  Wound Care  Progress Note    Problem List Items Addressed This Visit        Neuro    Spina bifida with hydrocephalus, lumbar region       Derm    Stage III pressure ulcer of right heel - Primary    Overview     HPI: 34 yr old female with history of hydrocephalus, in a wheelchair. Developed a stage 3 pressure ulcer to right heel due to lack of sensation    Location: right heel     Duration: 9-14-18     Context: heel is  Pressure     Associated Signs & Symptoms: denies pain    Timing: n/a    Severity: n/a    Quality: n/a    Modifying Factors: n/a    10-26-18: Here for wound care to right medial heel and right 4th toe wound  11-9-18: here for wound care to right medial heel and right 4th toe wounds  11-16-18: here for wound care for right medial heel wound  11-30-18: here for wound care for right medial heel pressure ulcer.  12-7-18: here for wound care for right medial heel pressure ulcer.  12-14-18: here for wound care to right medial heel  12-21-18:  Here for wound care to right medial heel.  12-28-18: here for wound care to right medial heel pressure ulcer  1-4-19: here for wound care to right medial heel pressure ulcer  1-11-19: here for wound care to right medial heel pressure ulcer and a neuropathic ulcer to her right great toe that has been there for over a month.  1-18-19: here for wound care to right medial pressure ulcer and right great toe neuropathic ulcer  1-25-19: here for wound care for right medial heel stage 3 pressure ulcer and right great toe neuropathic ulcer  2-1-19: here for wound care for right medial heel stage 3 pressure ulcer and neuropathic ulcer to right great toe.  2-8-19: here for wound care for right medial heel stage 3 pressure ulcer  2-15-19: here for wound care for right heel pressure ulcer             Physical Exam   Constitutional: She is oriented to person, place, and time. She appears well-developed and well-nourished.   HENT:   Head: Normocephalic.    Pulmonary/Chest: Effort normal.   Musculoskeletal:   Pt is paraplegia due to spina bifida   Neurological: She is alert and oriented to person, place, and time.   Skin: Skin is warm and dry.   Stage 3 pressure ulcer to right heel with adipose exposed, getting smaller.   Psychiatric: She has a normal mood and affect. Her behavior is normal. Judgment and thought content normal.   Vitals reviewed.  Debrided today and will continue topical abx, doing well and getting smaller, will recheck in one week  See wound doc progress notes. Documents will be scanned.        Hyacinth CastleBoeuf  [facility]

## 2019-02-22 ENCOUNTER — OFFICE VISIT (OUTPATIENT)
Dept: WOUND CARE | Facility: HOSPITAL | Age: 34
End: 2019-02-22
Attending: NURSE PRACTITIONER
Payer: MEDICAID

## 2019-02-22 VITALS — DIASTOLIC BLOOD PRESSURE: 83 MMHG | SYSTOLIC BLOOD PRESSURE: 123 MMHG | RESPIRATION RATE: 18 BRPM | HEART RATE: 84 BPM

## 2019-02-22 DIAGNOSIS — L89.613 STAGE III PRESSURE ULCER OF RIGHT HEEL: Primary | ICD-10-CM

## 2019-02-22 DIAGNOSIS — Q05.2 SPINA BIFIDA WITH HYDROCEPHALUS, LUMBAR REGION: ICD-10-CM

## 2019-02-22 NOTE — PROGRESS NOTES
[facility]  Wound Care  Progress Note    Problem List Items Addressed This Visit        Neuro    Spina bifida with hydrocephalus, lumbar region       Derm    Stage III pressure ulcer of right heel - Primary    Overview     HPI: 34 yr old female with history of hydrocephalus, in a wheelchair. Developed a stage 3 pressure ulcer to right heel due to lack of sensation    Location: right heel     Duration: 9-14-18     Context: heel is  Pressure     Associated Signs & Symptoms: denies pain    Timing: n/a    Severity: n/a    Quality: n/a    Modifying Factors: n/a    10-26-18: Here for wound care to right medial heel and right 4th toe wound  11-9-18: here for wound care to right medial heel and right 4th toe wounds  11-16-18: here for wound care for right medial heel wound  11-30-18: here for wound care for right medial heel pressure ulcer.  12-7-18: here for wound care for right medial heel pressure ulcer.  12-14-18: here for wound care to right medial heel  12-21-18:  Here for wound care to right medial heel.  12-28-18: here for wound care to right medial heel pressure ulcer  1-4-19: here for wound care to right medial heel pressure ulcer  1-11-19: here for wound care to right medial heel pressure ulcer and a neuropathic ulcer to her right great toe that has been there for over a month.  1-18-19: here for wound care to right medial pressure ulcer and right great toe neuropathic ulcer  1-25-19: here for wound care for right medial heel stage 3 pressure ulcer and right great toe neuropathic ulcer  2-1-19: here for wound care for right medial heel stage 3 pressure ulcer and neuropathic ulcer to right great toe.  2-8-19: here for wound care for right medial heel stage 3 pressure ulcer  2-15-19: here for wound care for right heel pressure ulcer  2-22-19: here for wound care for right heel pressure ulcer             Physical Exam   Constitutional: She is oriented to person, place, and time. She appears  well-developed and well-nourished.   HENT:   Head: Normocephalic.   Pulmonary/Chest: Effort normal.   Musculoskeletal: Normal range of motion.   Neurological: She is alert and oriented to person, place, and time.   Skin: Skin is warm and dry.   Stage 3 pressure ulcer to right heel with adipose exposed   Psychiatric: She has a normal mood and affect. Her behavior is normal. Judgment and thought content normal.   Vitals reviewed.    Pressure ulcer is larger today, pt states some skin pulled off when she changed her dressing during the week, will continue topical abx, pt has a UTI presently, will recheck in one week.  See wound doc progress notes. Documents will be scanned.        Laina LeBoeuf [facility]

## 2019-03-01 ENCOUNTER — OFFICE VISIT (OUTPATIENT)
Dept: WOUND CARE | Facility: HOSPITAL | Age: 34
End: 2019-03-01
Attending: NURSE PRACTITIONER
Payer: MEDICAID

## 2019-03-01 VITALS
DIASTOLIC BLOOD PRESSURE: 80 MMHG | TEMPERATURE: 98 F | HEART RATE: 77 BPM | RESPIRATION RATE: 18 BRPM | SYSTOLIC BLOOD PRESSURE: 117 MMHG

## 2019-03-01 DIAGNOSIS — Q05.2 SPINA BIFIDA WITH HYDROCEPHALUS, LUMBAR REGION: Primary | ICD-10-CM

## 2019-03-01 DIAGNOSIS — L89.613 STAGE III PRESSURE ULCER OF RIGHT HEEL: ICD-10-CM

## 2019-03-01 PROCEDURE — 11042 DBRDMT SUBQ TIS 1ST 20SQCM/<: CPT

## 2019-03-01 PROCEDURE — 27201912 HC WOUND CARE DEBRIDEMENT SUPPLIES

## 2019-03-01 NOTE — PROGRESS NOTES
[facility]  Wound Care  Progress Note    Problem List Items Addressed This Visit        Neuro    Spina bifida with hydrocephalus, lumbar region - Primary       Derm    Stage III pressure ulcer of right heel    Overview     HPI: 34 yr old female with history of hydrocephalus, in a wheelchair. Developed a stage 3 pressure ulcer to right heel due to lack of sensation    Location: right heel     Duration: 9-14-18     Context: heel is  Pressure     Associated Signs & Symptoms: denies pain    Timing: n/a    Severity: n/a    Quality: n/a    Modifying Factors: n/a    10-26-18: Here for wound care to right medial heel and right 4th toe wound  11-9-18: here for wound care to right medial heel and right 4th toe wounds  11-16-18: here for wound care for right medial heel wound  11-30-18: here for wound care for right medial heel pressure ulcer.  12-7-18: here for wound care for right medial heel pressure ulcer.  12-14-18: here for wound care to right medial heel  12-21-18:  Here for wound care to right medial heel.  12-28-18: here for wound care to right medial heel pressure ulcer  1-4-19: here for wound care to right medial heel pressure ulcer  1-11-19: here for wound care to right medial heel pressure ulcer and a neuropathic ulcer to her right great toe that has been there for over a month.  1-18-19: here for wound care to right medial pressure ulcer and right great toe neuropathic ulcer  1-25-19: here for wound care for right medial heel stage 3 pressure ulcer and right great toe neuropathic ulcer  2-1-19: here for wound care for right medial heel stage 3 pressure ulcer and neuropathic ulcer to right great toe.  2-8-19: here for wound care for right medial heel stage 3 pressure ulcer  2-15-19: here for wound care for right heel pressure ulcer  2-22-19: here for wound care for right heel pressure ulcer  3-1-19: here for wound car for right heel pressure ulcer           Physical Exam   Constitutional: She  is oriented to person, place, and time. She appears well-developed and well-nourished.   HENT:   Head: Normocephalic.   Pulmonary/Chest: Effort normal.   Musculoskeletal:   Pt has spina bifida and is paralyzed from the waist down   Neurological: She is alert and oriented to person, place, and time.   Skin: Skin is warm and dry.   Stage 3 pressure ulcer to right heel with adipose exposed.   Psychiatric: She has a normal mood and affect. Her behavior is normal. Judgment and thought content normal.   Vitals reviewed.      Pressure ulcer debrided and will continue topical abx daily, will recheck in one week.  See wound doc progress notes. Documents will be scanned.        Laina LeBoeuf [facility]

## 2019-03-08 ENCOUNTER — OFFICE VISIT (OUTPATIENT)
Dept: WOUND CARE | Facility: HOSPITAL | Age: 34
End: 2019-03-08
Attending: NURSE PRACTITIONER
Payer: MEDICAID

## 2019-03-08 VITALS — DIASTOLIC BLOOD PRESSURE: 76 MMHG | SYSTOLIC BLOOD PRESSURE: 132 MMHG | RESPIRATION RATE: 18 BRPM | HEART RATE: 64 BPM

## 2019-03-08 DIAGNOSIS — L89.613 STAGE III PRESSURE ULCER OF RIGHT HEEL: Primary | ICD-10-CM

## 2019-03-08 PROCEDURE — 99213 OFFICE O/P EST LOW 20 MIN: CPT

## 2019-03-08 NOTE — PROGRESS NOTES
[facility]  Wound Care  Progress Note    Problem List Items Addressed This Visit        Derm    Stage III pressure ulcer of right heel - Primary    Overview     HPI: 34 yr old female with history of hydrocephalus, in a wheelchair. Developed a stage 3 pressure ulcer to right heel due to lack of sensation    Location: right heel     Duration: 9-14-18     Context: heel is  Pressure     Associated Signs & Symptoms: denies pain    Timing: n/a    Severity: n/a    Quality: n/a    Modifying Factors: n/a    10-26-18: Here for wound care to right medial heel and right 4th toe wound  11-9-18: here for wound care to right medial heel and right 4th toe wounds  11-16-18: here for wound care for right medial heel wound  11-30-18: here for wound care for right medial heel pressure ulcer.  12-7-18: here for wound care for right medial heel pressure ulcer.  12-14-18: here for wound care to right medial heel  12-21-18:  Here for wound care to right medial heel.  12-28-18: here for wound care to right medial heel pressure ulcer  1-4-19: here for wound care to right medial heel pressure ulcer  1-11-19: here for wound care to right medial heel pressure ulcer and a neuropathic ulcer to her right great toe that has been there for over a month.  1-18-19: here for wound care to right medial pressure ulcer and right great toe neuropathic ulcer  1-25-19: here for wound care for right medial heel stage 3 pressure ulcer and right great toe neuropathic ulcer  2-1-19: here for wound care for right medial heel stage 3 pressure ulcer and neuropathic ulcer to right great toe.  2-8-19: here for wound care for right medial heel stage 3 pressure ulcer  2-15-19: here for wound care for right heel pressure ulcer  2-22-19: here for wound care for right heel pressure ulcer  3-1-19: here for wound care for right heel pressure ulcer  3-8-19: here for wound care for right heel pressure ulcer             Physical Exam   Constitutional: She  is oriented to person, place, and time. She appears well-developed and well-nourished.   HENT:   Head: Normocephalic.   Pulmonary/Chest: Effort normal.   Musculoskeletal:   Pt is paraplegic due to spina bifida   Neurological: She is alert and oriented to person, place, and time.   Skin: Skin is warm and dry.   Pressure ulcer stage 3 to right heel with adipose exposed   Psychiatric: She has a normal mood and affect. Her behavior is normal. Judgment and thought content normal.   Vitals reviewed.     silver nitrate applied due to hypergranulation tissue, will continue topical abx daily and will recheck in one week.  See wound doc progress notes. Documents will be scanned.        Hyacinth LeBoeuf [facility]

## 2019-03-15 ENCOUNTER — OFFICE VISIT (OUTPATIENT)
Dept: WOUND CARE | Facility: HOSPITAL | Age: 34
End: 2019-03-15
Attending: NURSE PRACTITIONER
Payer: MEDICAID

## 2019-03-15 VITALS — DIASTOLIC BLOOD PRESSURE: 94 MMHG | SYSTOLIC BLOOD PRESSURE: 131 MMHG | HEART RATE: 91 BPM | RESPIRATION RATE: 20 BRPM

## 2019-03-15 DIAGNOSIS — Q05.2 SPINA BIFIDA WITH HYDROCEPHALUS, LUMBAR REGION: ICD-10-CM

## 2019-03-15 DIAGNOSIS — L89.613 STAGE III PRESSURE ULCER OF RIGHT HEEL: Primary | ICD-10-CM

## 2019-03-15 PROCEDURE — 11042 DBRDMT SUBQ TIS 1ST 20SQCM/<: CPT

## 2019-03-15 PROCEDURE — 27201912 HC WOUND CARE DEBRIDEMENT SUPPLIES

## 2019-03-15 NOTE — PROGRESS NOTES
[facility]  Wound Care  Progress Note    Problem List Items Addressed This Visit        Neuro    Spina bifida with hydrocephalus, lumbar region       Derm    Stage III pressure ulcer of right heel - Primary    Overview     HPI: 34 yr old female with history of hydrocephalus, in a wheelchair. Developed a stage 3 pressure ulcer to right heel due to lack of sensation    Location: right heel     Duration: 9-14-18     Context: heel is  Pressure     Associated Signs & Symptoms: denies pain    Timing: n/a    Severity: n/a    Quality: n/a    Modifying Factors: n/a    10-26-18: Here for wound care to right medial heel and right 4th toe wound  11-9-18: here for wound care to right medial heel and right 4th toe wounds  11-16-18: here for wound care for right medial heel wound  11-30-18: here for wound care for right medial heel pressure ulcer.  12-7-18: here for wound care for right medial heel pressure ulcer.  12-14-18: here for wound care to right medial heel  12-21-18:  Here for wound care to right medial heel.  12-28-18: here for wound care to right medial heel pressure ulcer  1-4-19: here for wound care to right medial heel pressure ulcer  1-11-19: here for wound care to right medial heel pressure ulcer and a neuropathic ulcer to her right great toe that has been there for over a month.  1-18-19: here for wound care to right medial pressure ulcer and right great toe neuropathic ulcer  1-25-19: here for wound care for right medial heel stage 3 pressure ulcer and right great toe neuropathic ulcer  2-1-19: here for wound care for right medial heel stage 3 pressure ulcer and neuropathic ulcer to right great toe.  2-8-19: here for wound care for right medial heel stage 3 pressure ulcer  2-15-19: here for wound care for right heel pressure ulcer  2-22-19: here for wound care for right heel pressure ulcer  3-1-19: here for wound care for right heel pressure ulcer  3-8-19: here for wound care for right heel  pressure ulcer  3-15-19: here for wound care for right stage 3 pressure ulcer             Physical Exam   Constitutional: She is oriented to person, place, and time. She appears well-developed and well-nourished.   HENT:   Head: Normocephalic.   Musculoskeletal:   Pt is a paraplegic due to spina bifida, she is in a wheelchair   Neurological: She is alert and oriented to person, place, and time.   Skin: Skin is warm and dry.   Stage 3 pressure ulcer to right heel with adipose exposed,   Psychiatric: She has a normal mood and affect. Her behavior is normal. Judgment and thought content normal.   Vitals reviewed.    Wound is getting smaller, pt has not been using abx daily as instructed, debrided of small amount of slough and will use abx daily, cover with alginate and will recheck in one week.  See wound doc progress notes. Documents will be scanned.        Hyacinth Aguero  [facility]

## 2019-03-22 ENCOUNTER — OFFICE VISIT (OUTPATIENT)
Dept: WOUND CARE | Facility: HOSPITAL | Age: 34
End: 2019-03-22
Attending: NURSE PRACTITIONER
Payer: MEDICAID

## 2019-03-22 VITALS — DIASTOLIC BLOOD PRESSURE: 76 MMHG | HEART RATE: 81 BPM | RESPIRATION RATE: 18 BRPM | SYSTOLIC BLOOD PRESSURE: 132 MMHG

## 2019-03-22 DIAGNOSIS — L89.613 STAGE III PRESSURE ULCER OF RIGHT HEEL: ICD-10-CM

## 2019-03-22 DIAGNOSIS — Q05.2 SPINA BIFIDA WITH HYDROCEPHALUS, LUMBAR REGION: Primary | ICD-10-CM

## 2019-03-22 PROCEDURE — 27201912 HC WOUND CARE DEBRIDEMENT SUPPLIES

## 2019-03-22 PROCEDURE — 11042 DBRDMT SUBQ TIS 1ST 20SQCM/<: CPT

## 2019-03-22 NOTE — PROGRESS NOTES
[facility]  Wound Care  Progress Note    Problem List Items Addressed This Visit        Neuro    Spina bifida with hydrocephalus, lumbar region - Primary       Derm    Stage III pressure ulcer of right heel    Overview     HPI: 34 yr old female with history of hydrocephalus, in a wheelchair. Developed a stage 3 pressure ulcer to right heel due to lack of sensation    Location: right heel     Duration: 9-14-18     Context: heel is  Pressure     Associated Signs & Symptoms: denies pain    Timing: n/a    Severity: n/a    Quality: n/a    Modifying Factors: n/a    10-26-18: Here for wound care to right medial heel and right 4th toe wound  11-9-18: here for wound care to right medial heel and right 4th toe wounds  11-16-18: here for wound care for right medial heel wound  11-30-18: here for wound care for right medial heel pressure ulcer.  12-7-18: here for wound care for right medial heel pressure ulcer.  12-14-18: here for wound care to right medial heel  12-21-18:  Here for wound care to right medial heel.  12-28-18: here for wound care to right medial heel pressure ulcer  1-4-19: here for wound care to right medial heel pressure ulcer  1-11-19: here for wound care to right medial heel pressure ulcer and a neuropathic ulcer to her right great toe that has been there for over a month.  1-18-19: here for wound care to right medial pressure ulcer and right great toe neuropathic ulcer  1-25-19: here for wound care for right medial heel stage 3 pressure ulcer and right great toe neuropathic ulcer  2-1-19: here for wound care for right medial heel stage 3 pressure ulcer and neuropathic ulcer to right great toe.  2-8-19: here for wound care for right medial heel stage 3 pressure ulcer  2-15-19: here for wound care for right heel pressure ulcer  2-22-19: here for wound care for right heel pressure ulcer  3-1-19: here for wound care for right heel pressure ulcer  3-8-19: here for wound care for right heel  pressure ulcer  3-15-19: here for wound care for right stage 3 pressure ulcer  3-22-19: here for wound care for stage 3 pressure ulcer to her right heel             Physical Exam   Constitutional: She is oriented to person, place, and time. She appears well-developed and well-nourished.   HENT:   Head: Normocephalic.   Pulmonary/Chest: Effort normal.   Musculoskeletal:   Pt is paraplegic due to spina bifida   Neurological: She is alert and oriented to person, place, and time.   Skin: Skin is warm and dry.   Healing stage 3 pressure ulcer to right heel with adipose exposed.   Psychiatric: She has a normal mood and affect. Her behavior is normal. Judgment and thought content normal.     Small amount of slough debrided, will continue topical abx, wound is doing well and getting smaller, will recheck in one week.  See wound doc progress notes. Documents will be scanned.        Hyacinth Aguero  [facility]

## 2019-03-29 ENCOUNTER — OFFICE VISIT (OUTPATIENT)
Dept: WOUND CARE | Facility: HOSPITAL | Age: 34
End: 2019-03-29
Attending: NURSE PRACTITIONER
Payer: MEDICAID

## 2019-03-29 VITALS — RESPIRATION RATE: 18 BRPM | DIASTOLIC BLOOD PRESSURE: 72 MMHG | SYSTOLIC BLOOD PRESSURE: 138 MMHG | HEART RATE: 86 BPM

## 2019-03-29 DIAGNOSIS — L89.613 STAGE III PRESSURE ULCER OF RIGHT HEEL: Primary | ICD-10-CM

## 2019-03-29 PROCEDURE — 11042 DBRDMT SUBQ TIS 1ST 20SQCM/<: CPT

## 2019-03-29 PROCEDURE — 27201912 HC WOUND CARE DEBRIDEMENT SUPPLIES

## 2019-03-29 NOTE — PROGRESS NOTES
[facility]  Wound Care  Progress Note    Problem List Items Addressed This Visit        Derm    Stage III pressure ulcer of right heel - Primary    Overview     HPI: 34 yr old female with history of hydrocephalus, in a wheelchair. Developed a stage 3 pressure ulcer to right heel due to lack of sensation    Location: right heel     Duration: 9-14-18     Context: heel is  Pressure     Associated Signs & Symptoms: denies pain    Timing: n/a    Severity: n/a    Quality: n/a    Modifying Factors: n/a    10-26-18: Here for wound care to right medial heel and right 4th toe wound  11-9-18: here for wound care to right medial heel and right 4th toe wounds  11-16-18: here for wound care for right medial heel wound  11-30-18: here for wound care for right medial heel pressure ulcer.  12-7-18: here for wound care for right medial heel pressure ulcer.  12-14-18: here for wound care to right medial heel  12-21-18:  Here for wound care to right medial heel.  12-28-18: here for wound care to right medial heel pressure ulcer  1-4-19: here for wound care to right medial heel pressure ulcer  1-11-19: here for wound care to right medial heel pressure ulcer and a neuropathic ulcer to her right great toe that has been there for over a month.  1-18-19: here for wound care to right medial pressure ulcer and right great toe neuropathic ulcer  1-25-19: here for wound care for right medial heel stage 3 pressure ulcer and right great toe neuropathic ulcer  2-1-19: here for wound care for right medial heel stage 3 pressure ulcer and neuropathic ulcer to right great toe.  2-8-19: here for wound care for right medial heel stage 3 pressure ulcer  2-15-19: here for wound care for right heel pressure ulcer  2-22-19: here for wound care for right heel pressure ulcer  3-1-19: here for wound care for right heel pressure ulcer  3-8-19: here for wound care for right heel pressure ulcer  3-15-19: here for wound care for right stage 3  pressure ulcer  3-22-19: here for wound care for stage 3 pressure ulcer to her right heel  3-29-19: here for wound care for stage 3 pressure ulcer to right heel             Physical Exam   Constitutional: She is oriented to person, place, and time. She appears well-developed and well-nourished.   HENT:   Head: Normocephalic.   Pulmonary/Chest: Effort normal.   Musculoskeletal:   Pt in a wheelchair due to spina bifida   Neurological: She is alert and oriented to person, place, and time.   Skin: Skin is warm and dry.   Healing stage 3 pressure ulcer to right heel with adipose exposed.   Psychiatric: She has a normal mood and affect. Her behavior is normal. Judgment and thought content normal.   Vitals reviewed.    Debrided today and will continue topical abx, wound is smaller today and doing well, will recheck in one week.  See wound doc progress notes. Documents will be scanned.        Hyacinth Aguero  [facility]

## 2019-04-05 ENCOUNTER — OFFICE VISIT (OUTPATIENT)
Dept: WOUND CARE | Facility: HOSPITAL | Age: 34
End: 2019-04-05
Attending: NURSE PRACTITIONER
Payer: MEDICAID

## 2019-04-05 VITALS
DIASTOLIC BLOOD PRESSURE: 72 MMHG | SYSTOLIC BLOOD PRESSURE: 124 MMHG | TEMPERATURE: 98 F | HEART RATE: 74 BPM | RESPIRATION RATE: 18 BRPM

## 2019-04-05 DIAGNOSIS — L89.613 STAGE III PRESSURE ULCER OF RIGHT HEEL: ICD-10-CM

## 2019-04-05 DIAGNOSIS — Q05.2 SPINA BIFIDA WITH HYDROCEPHALUS, LUMBAR REGION: Primary | ICD-10-CM

## 2019-04-05 PROCEDURE — 99213 OFFICE O/P EST LOW 20 MIN: CPT

## 2019-04-05 NOTE — PROGRESS NOTES
[facility]  Wound Care  Progress Note    Problem List Items Addressed This Visit        Neuro    Spina bifida with hydrocephalus, lumbar region - Primary       Derm    Stage III pressure ulcer of right heel    Overview     HPI: 34 yr old female with history of hydrocephalus, in a wheelchair. Developed a stage 3 pressure ulcer to right heel due to lack of sensation    Location: right heel     Duration: 9-14-18     Context: heel is  Pressure     Associated Signs & Symptoms: denies pain    Timing: n/a    Severity: n/a    Quality: n/a    Modifying Factors: n/a    10-26-18: Here for wound care to right medial heel and right 4th toe wound  11-9-18: here for wound care to right medial heel and right 4th toe wounds  11-16-18: here for wound care for right medial heel wound  11-30-18: here for wound care for right medial heel pressure ulcer.  12-7-18: here for wound care for right medial heel pressure ulcer.  12-14-18: here for wound care to right medial heel  12-21-18:  Here for wound care to right medial heel.  12-28-18: here for wound care to right medial heel pressure ulcer  1-4-19: here for wound care to right medial heel pressure ulcer  1-11-19: here for wound care to right medial heel pressure ulcer and a neuropathic ulcer to her right great toe that has been there for over a month.  1-18-19: here for wound care to right medial pressure ulcer and right great toe neuropathic ulcer  1-25-19: here for wound care for right medial heel stage 3 pressure ulcer and right great toe neuropathic ulcer  2-1-19: here for wound care for right medial heel stage 3 pressure ulcer and neuropathic ulcer to right great toe.  2-8-19: here for wound care for right medial heel stage 3 pressure ulcer  2-15-19: here for wound care for right heel pressure ulcer  2-22-19: here for wound care for right heel pressure ulcer  3-1-19: here for wound care for right heel pressure ulcer  3-8-19: here for wound care for right heel  pressure ulcer  3-15-19: here for wound care for right stage 3 pressure ulcer  3-22-19: here for wound care for stage 3 pressure ulcer to her right heel  3-29-19: here for wound care for stage 3 pressure ulcer to right heel  4-5-19: here for wound care for stage 3 pressure ulcer to right heel.             Physical Exam   Constitutional: She is oriented to person, place, and time. She appears well-developed and well-nourished.   HENT:   Head: Normocephalic and atraumatic.   Pulmonary/Chest: Effort normal.   Musculoskeletal:   Pt is paraplegic due to spina bifida   Neurological: She is alert and oriented to person, place, and time.   Skin: Skin is warm and dry.   Superficial healing stage 3 to right heel   Psychiatric: She has a normal mood and affect. Her behavior is normal. Judgment and thought content normal.   Vitals reviewed.    Wound is very small and superficial, will use gentian violet and border gauze, will recheck in one week.  See wound doc progress notes. Documents will be scanned.        Hyacinth Aguero  [facility]

## 2019-04-26 ENCOUNTER — OFFICE VISIT (OUTPATIENT)
Dept: WOUND CARE | Facility: HOSPITAL | Age: 34
End: 2019-04-26
Attending: NURSE PRACTITIONER
Payer: MEDICAID

## 2019-04-26 VITALS — DIASTOLIC BLOOD PRESSURE: 90 MMHG | HEART RATE: 73 BPM | RESPIRATION RATE: 18 BRPM | SYSTOLIC BLOOD PRESSURE: 119 MMHG

## 2019-04-26 DIAGNOSIS — G60.9 HEREDITARY AND IDIOPATHIC NEUROPATHY, UNSPECIFIED: ICD-10-CM

## 2019-04-26 DIAGNOSIS — L97.511 ULCER OF TOE OF RIGHT FOOT, LIMITED TO BREAKDOWN OF SKIN: ICD-10-CM

## 2019-04-26 DIAGNOSIS — L89.613 STAGE III PRESSURE ULCER OF RIGHT HEEL: Primary | ICD-10-CM

## 2019-04-26 DIAGNOSIS — Q05.2 SPINA BIFIDA WITH HYDROCEPHALUS, LUMBAR REGION: ICD-10-CM

## 2019-04-26 PROCEDURE — 97597 DBRDMT OPN WND 1ST 20 CM/<: CPT

## 2019-04-26 NOTE — PROGRESS NOTES
[facility]  Wound Care  Progress Note    Problem List Items Addressed This Visit        Neuro    Hereditary and idiopathic neuropathy, unspecified    Spina bifida with hydrocephalus, lumbar region       Derm    Stage III pressure ulcer of right heel - Primary    Overview     HPI: 34 yr old female with history of hydrocephalus, in a wheelchair. Developed a stage 3 pressure ulcer to right heel due to lack of sensation    Location: right heel     Duration: 9-14-18     Context: heel is  Pressure     Associated Signs & Symptoms: denies pain    Timing: n/a    Severity: n/a    Quality: n/a    Modifying Factors: n/a    10-26-18: Here for wound care to right medial heel and right 4th toe wound  11-9-18: here for wound care to right medial heel and right 4th toe wounds  11-16-18: here for wound care for right medial heel wound  11-30-18: here for wound care for right medial heel pressure ulcer.  12-7-18: here for wound care for right medial heel pressure ulcer.  12-14-18: here for wound care to right medial heel  12-21-18:  Here for wound care to right medial heel.  12-28-18: here for wound care to right medial heel pressure ulcer  1-4-19: here for wound care to right medial heel pressure ulcer  1-11-19: here for wound care to right medial heel pressure ulcer and a neuropathic ulcer to her right great toe that has been there for over a month.  1-18-19: here for wound care to right medial pressure ulcer and right great toe neuropathic ulcer  1-25-19: here for wound care for right medial heel stage 3 pressure ulcer and right great toe neuropathic ulcer  2-1-19: here for wound care for right medial heel stage 3 pressure ulcer and neuropathic ulcer to right great toe.  2-8-19: here for wound care for right medial heel stage 3 pressure ulcer  2-15-19: here for wound care for right heel pressure ulcer  2-22-19: here for wound care for right heel pressure ulcer  3-1-19: here for wound care for right heel pressure  ulcer  3-8-19: here for wound care for right heel pressure ulcer  3-15-19: here for wound care for right stage 3 pressure ulcer  3-22-19: here for wound care for stage 3 pressure ulcer to her right heel  3-29-19: here for wound care for stage 3 pressure ulcer to right heel  4-5-19: here for wound care for stage 3 pressure ulcer to right heel.  4-26-19: here for wound care for stage 3 pressure ulcer to right heel and a new neuropathic ulcer to right great toe.         Ulcer of toe of right foot, limited to breakdown of skin      Physical Exam   Constitutional: She is oriented to person, place, and time. She appears well-developed and well-nourished.   HENT:   Head: Normocephalic.   Pulmonary/Chest: Effort normal.   Musculoskeletal:   Pt is paraplegic due to spina bifida   Neurological: She is alert and oriented to person, place, and time.   Skin: Skin is warm and dry.   Stage 3 pressure ulcer to right heel is superficial today, pt has a new neuropathic ulcer to her right great toe that is superficial , pt is paraplegic and does not feel her feet.   Psychiatric: She has a normal mood and affect. Her behavior is normal. Judgment and thought content normal.   Vitals reviewed.      Will use mepilex ag to both wounds, pt does not feel her feet therefore she knocks them into things without knowing, encouraged to be as careful as possible and to ask for assistance, will recheck in one week.  See wound doc progress notes. Documents will be scanned.        Hyacinth Aguero  [facility]

## 2019-05-03 ENCOUNTER — OFFICE VISIT (OUTPATIENT)
Dept: WOUND CARE | Facility: HOSPITAL | Age: 34
End: 2019-05-03
Attending: NURSE PRACTITIONER
Payer: MEDICAID

## 2019-05-03 VITALS — HEART RATE: 81 BPM | RESPIRATION RATE: 20 BRPM | SYSTOLIC BLOOD PRESSURE: 114 MMHG | DIASTOLIC BLOOD PRESSURE: 80 MMHG

## 2019-05-03 DIAGNOSIS — Q05.2 SPINA BIFIDA WITH HYDROCEPHALUS, LUMBAR REGION: Primary | ICD-10-CM

## 2019-05-03 DIAGNOSIS — L97.511 ULCER OF TOE OF RIGHT FOOT, LIMITED TO BREAKDOWN OF SKIN: ICD-10-CM

## 2019-05-03 DIAGNOSIS — L89.613 STAGE III PRESSURE ULCER OF RIGHT HEEL: ICD-10-CM

## 2019-05-03 PROCEDURE — 99213 OFFICE O/P EST LOW 20 MIN: CPT

## 2019-05-03 NOTE — PROGRESS NOTES
[facility]  Wound Care  Progress Note    Problem List Items Addressed This Visit        Neuro    Spina bifida with hydrocephalus, lumbar region - Primary       Derm    Stage III pressure ulcer of right heel    Overview     HPI: 34 yr old female with history of hydrocephalus, in a wheelchair. Developed a stage 3 pressure ulcer to right heel due to lack of sensation    Location: right heel and right great toe    Duration: 9-14-18 and 4-26-19    Context: heel is  Pressure and toe is neuropathic    Associated Signs & Symptoms: denies pain    Timing: n/a    Severity: n/a    Quality: n/a    Modifying Factors: n/a    10-26-18: Here for wound care to right medial heel and right 4th toe wound  11-9-18: here for wound care to right medial heel and right 4th toe wounds  11-16-18: here for wound care for right medial heel wound  11-30-18: here for wound care for right medial heel pressure ulcer.  12-7-18: here for wound care for right medial heel pressure ulcer.  12-14-18: here for wound care to right medial heel  12-21-18:  Here for wound care to right medial heel.  12-28-18: here for wound care to right medial heel pressure ulcer  1-4-19: here for wound care to right medial heel pressure ulcer  1-11-19: here for wound care to right medial heel pressure ulcer and a neuropathic ulcer to her right great toe that has been there for over a month.  1-18-19: here for wound care to right medial pressure ulcer and right great toe neuropathic ulcer  1-25-19: here for wound care for right medial heel stage 3 pressure ulcer and right great toe neuropathic ulcer  2-1-19: here for wound care for right medial heel stage 3 pressure ulcer and neuropathic ulcer to right great toe.  2-8-19: here for wound care for right medial heel stage 3 pressure ulcer  2-15-19: here for wound care for right heel pressure ulcer  2-22-19: here for wound care for right heel pressure ulcer  3-1-19: here for wound care for right heel pressure  ulcer  3-8-19: here for wound care for right heel pressure ulcer  3-15-19: here for wound care for right stage 3 pressure ulcer  3-22-19: here for wound care for stage 3 pressure ulcer to her right heel  3-29-19: here for wound care for stage 3 pressure ulcer to right heel  4-5-19: here for wound care for stage 3 pressure ulcer to right heel.  4-26-19: here for wound care for stage 3 pressure ulcer to right heel and a new neuropathic ulcer to right great toe.  5-3-19: here for wound care for stage 3 pressure ulcer to right heel and a  neuropathic ulcer to right great toe.         Ulcer of toe of right foot, limited to breakdown of skin        Physical Exam   Constitutional: She is oriented to person, place, and time. She appears well-developed and well-nourished.   HENT:   Head: Normocephalic.   Pulmonary/Chest: Effort normal.   Musculoskeletal:   Pt is paraplegic due to spina bifida   Neurological: She is alert and oriented to person, place, and time.   Skin: Skin is warm and dry.   Stage 3 pressure, slightly larger but is very superficial today, right great toe is a neuropathic ulcer that is superficial today.   Psychiatric: She has a normal mood and affect. Her behavior is normal. Judgment and thought content normal.   Vitals reviewed.    Will continue mepilex ag to heel twice a week and gentian violet to toe, keep clean and dry and will recheck in one week.  See wound doc progress notes. Documents will be scanned.        Hyacinth Aguero  [facility]

## 2019-05-17 ENCOUNTER — OFFICE VISIT (OUTPATIENT)
Dept: WOUND CARE | Facility: HOSPITAL | Age: 34
End: 2019-05-17
Attending: NURSE PRACTITIONER
Payer: MEDICAID

## 2019-05-17 VITALS
DIASTOLIC BLOOD PRESSURE: 96 MMHG | TEMPERATURE: 98 F | RESPIRATION RATE: 18 BRPM | HEART RATE: 85 BPM | SYSTOLIC BLOOD PRESSURE: 128 MMHG

## 2019-05-17 DIAGNOSIS — L89.613 STAGE III PRESSURE ULCER OF RIGHT HEEL: ICD-10-CM

## 2019-05-17 DIAGNOSIS — L97.511 NON-PRESSURE CHRONIC ULCER OF OTHER PART OF RIGHT FOOT LIMITED TO BREAKDOWN OF SKIN: ICD-10-CM

## 2019-05-17 DIAGNOSIS — Q05.2 SPINA BIFIDA WITH HYDROCEPHALUS, LUMBAR REGION: Primary | ICD-10-CM

## 2019-05-17 PROCEDURE — 27201912 HC WOUND CARE DEBRIDEMENT SUPPLIES

## 2019-05-17 PROCEDURE — 11042 DBRDMT SUBQ TIS 1ST 20SQCM/<: CPT

## 2019-05-17 NOTE — PROGRESS NOTES
[facility]  Wound Care  Progress Note    Problem List Items Addressed This Visit        Neuro    Spina bifida with hydrocephalus, lumbar region - Primary       Derm    Stage III pressure ulcer of right heel    Overview     HPI: 34 yr old female with history of hydrocephalus, in a wheelchair. Developed a stage 3 pressure ulcer to right heel due to lack of sensation    Location: right heel and right great toe    Duration: 9-14-18 and 4-26-19    Context: heel is  Pressure and toe is neuropathic    Associated Signs & Symptoms: denies pain    Timing: n/a    Severity: n/a    Quality: n/a    Modifying Factors: n/a    10-26-18: Here for wound care to right medial heel and right 4th toe wound  11-9-18: here for wound care to right medial heel and right 4th toe wounds  11-16-18: here for wound care for right medial heel wound  11-30-18: here for wound care for right medial heel pressure ulcer.  12-7-18: here for wound care for right medial heel pressure ulcer.  12-14-18: here for wound care to right medial heel  12-21-18:  Here for wound care to right medial heel.  12-28-18: here for wound care to right medial heel pressure ulcer  1-4-19: here for wound care to right medial heel pressure ulcer  1-11-19: here for wound care to right medial heel pressure ulcer and a neuropathic ulcer to her right great toe that has been there for over a month.  1-18-19: here for wound care to right medial pressure ulcer and right great toe neuropathic ulcer  1-25-19: here for wound care for right medial heel stage 3 pressure ulcer and right great toe neuropathic ulcer  2-1-19: here for wound care for right medial heel stage 3 pressure ulcer and neuropathic ulcer to right great toe.  2-8-19: here for wound care for right medial heel stage 3 pressure ulcer  2-15-19: here for wound care for right heel pressure ulcer  2-22-19: here for wound care for right heel pressure ulcer  3-1-19: here for wound care for right heel pressure  ulcer  3-8-19: here for wound care for right heel pressure ulcer  3-15-19: here for wound care for right stage 3 pressure ulcer  3-22-19: here for wound care for stage 3 pressure ulcer to her right heel  3-29-19: here for wound care for stage 3 pressure ulcer to right heel  4-5-19: here for wound care for stage 3 pressure ulcer to right heel.  4-26-19: here for wound care for stage 3 pressure ulcer to right heel and a new neuropathic ulcer to right great toe.  5-3-19: here for wound care for stage 3 pressure ulcer to right heel and a  neuropathic ulcer to right great toe.  5-17-19: here for wound care for stage 3 pressure ulcer to right heel, right toe is healed today         Non-pressure chronic ulcer of other part of right foot limited to breakdown of skin        Physical Exam   Constitutional: She is oriented to person, place, and time. She appears well-developed and well-nourished.   HENT:   Head: Normocephalic.   Pulmonary/Chest: Effort normal.   Musculoskeletal:   Paraplegic due to spina bifida   Neurological: She is alert and oriented to person, place, and time.   Skin: Skin is warm and dry.   Stage 3 pressure ulcer to right heel with adipose exposed, wound to right toe is healed.   Psychiatric: She has a normal mood and affect. Her behavior is normal. Judgment and thought content normal.   Vitals reviewed.    Ulcer debrided, looks better today, toe is healed, will use collagen and hydrofera blue twice weekly, will recheck in one week, keep clean and dry.  See wound doc progress notes. Documents will be scanned.        Hyacinth Aguero  [facility]

## 2019-05-24 ENCOUNTER — OFFICE VISIT (OUTPATIENT)
Dept: WOUND CARE | Facility: HOSPITAL | Age: 34
End: 2019-05-24
Attending: NURSE PRACTITIONER
Payer: MEDICAID

## 2019-05-24 VITALS
TEMPERATURE: 98 F | RESPIRATION RATE: 18 BRPM | DIASTOLIC BLOOD PRESSURE: 81 MMHG | HEART RATE: 81 BPM | SYSTOLIC BLOOD PRESSURE: 115 MMHG

## 2019-05-24 DIAGNOSIS — Q05.2 SPINA BIFIDA WITH HYDROCEPHALUS, LUMBAR REGION: Primary | ICD-10-CM

## 2019-05-24 DIAGNOSIS — L89.613 STAGE III PRESSURE ULCER OF RIGHT HEEL: ICD-10-CM

## 2019-05-24 PROCEDURE — 11042 DBRDMT SUBQ TIS 1ST 20SQCM/<: CPT

## 2019-05-24 PROCEDURE — 27201912 HC WOUND CARE DEBRIDEMENT SUPPLIES

## 2019-05-24 NOTE — PROGRESS NOTES
[facility]  Wound Care  Progress Note    Problem List Items Addressed This Visit        Neuro    Spina bifida with hydrocephalus, lumbar region - Primary       Derm    Stage III pressure ulcer of right heel    Overview     HPI: 34 yr old female with history of hydrocephalus, in a wheelchair. Developed a stage 3 pressure ulcer to right heel due to lack of sensation    Location: right heel and right great toe    Duration: 9-14-18 and 4-26-19    Context: heel is  Pressure and toe is neuropathic    Associated Signs & Symptoms: denies pain    Timing: n/a    Severity: n/a    Quality: n/a    Modifying Factors: n/a    10-26-18: Here for wound care to right medial heel and right 4th toe wound  11-9-18: here for wound care to right medial heel and right 4th toe wounds  11-16-18: here for wound care for right medial heel wound  11-30-18: here for wound care for right medial heel pressure ulcer.  12-7-18: here for wound care for right medial heel pressure ulcer.  12-14-18: here for wound care to right medial heel  12-21-18:  Here for wound care to right medial heel.  12-28-18: here for wound care to right medial heel pressure ulcer  1-4-19: here for wound care to right medial heel pressure ulcer  1-11-19: here for wound care to right medial heel pressure ulcer and a neuropathic ulcer to her right great toe that has been there for over a month.  1-18-19: here for wound care to right medial pressure ulcer and right great toe neuropathic ulcer  1-25-19: here for wound care for right medial heel stage 3 pressure ulcer and right great toe neuropathic ulcer  2-1-19: here for wound care for right medial heel stage 3 pressure ulcer and neuropathic ulcer to right great toe.  2-8-19: here for wound care for right medial heel stage 3 pressure ulcer  2-15-19: here for wound care for right heel pressure ulcer  2-22-19: here for wound care for right heel pressure ulcer  3-1-19: here for wound care for right heel pressure  ulcer  3-8-19: here for wound care for right heel pressure ulcer  3-15-19: here for wound care for right stage 3 pressure ulcer  3-22-19: here for wound care for stage 3 pressure ulcer to her right heel  3-29-19: here for wound care for stage 3 pressure ulcer to right heel  4-5-19: here for wound care for stage 3 pressure ulcer to right heel.  4-26-19: here for wound care for stage 3 pressure ulcer to right heel and a new neuropathic ulcer to right great toe.  5-3-19: here for wound care for stage 3 pressure ulcer to right heel and a  neuropathic ulcer to right great toe.  5-17-19: here for wound care for stage 3 pressure ulcer to right heel, right toe is healed today  5-24-19: here for wound care for stage 3 pressure ulcer to right heel             Physical Exam   Constitutional: She is oriented to person, place, and time. She appears well-developed and well-nourished.   HENT:   Head: Normocephalic.   Pulmonary/Chest: Effort normal.   Musculoskeletal:   Pt is paraplegic due to spina bifida   Neurological: She is alert and oriented to person, place, and time.   Skin: Skin is warm and dry.   Stage 3 pressure ulcer to right heel with adipose exposed.   Psychiatric: She has a normal mood and affect. Her behavior is normal. Judgment and thought content normal.   Vitals reviewed.  Ulcer debrided and will continue promogran and hydrofera blue q 3-4 days, keep clean and dry and will recheck in one week.    See wound doc progress notes. Documents will be scanned.        Hyacinth Aguero  [facility]

## 2019-05-31 ENCOUNTER — OFFICE VISIT (OUTPATIENT)
Dept: WOUND CARE | Facility: HOSPITAL | Age: 34
End: 2019-05-31
Attending: NURSE PRACTITIONER
Payer: MEDICAID

## 2019-05-31 VITALS — RESPIRATION RATE: 20 BRPM | SYSTOLIC BLOOD PRESSURE: 122 MMHG | HEART RATE: 79 BPM | DIASTOLIC BLOOD PRESSURE: 91 MMHG

## 2019-05-31 DIAGNOSIS — L89.613 STAGE III PRESSURE ULCER OF RIGHT HEEL: Primary | ICD-10-CM

## 2019-05-31 PROCEDURE — 99213 OFFICE O/P EST LOW 20 MIN: CPT

## 2019-05-31 NOTE — PROGRESS NOTES
[facility]  Wound Care  Progress Note    Problem List Items Addressed This Visit        Derm    Stage III pressure ulcer of right heel - Primary    Overview     HPI: 34 yr old female with history of hydrocephalus, in a wheelchair. Developed a stage 3 pressure ulcer to right heel due to lack of sensation    Location: right heel and right great toe    Duration: 9-14-18 and 4-26-19    Context: heel is  Pressure and toe is neuropathic    Associated Signs & Symptoms: denies pain    Timing: n/a    Severity: n/a    Quality: n/a    Modifying Factors: n/a    10-26-18: Here for wound care to right medial heel and right 4th toe wound  11-9-18: here for wound care to right medial heel and right 4th toe wounds  11-16-18: here for wound care for right medial heel wound  11-30-18: here for wound care for right medial heel pressure ulcer.  12-7-18: here for wound care for right medial heel pressure ulcer.  12-14-18: here for wound care to right medial heel  12-21-18:  Here for wound care to right medial heel.  12-28-18: here for wound care to right medial heel pressure ulcer  1-4-19: here for wound care to right medial heel pressure ulcer  1-11-19: here for wound care to right medial heel pressure ulcer and a neuropathic ulcer to her right great toe that has been there for over a month.  1-18-19: here for wound care to right medial pressure ulcer and right great toe neuropathic ulcer  1-25-19: here for wound care for right medial heel stage 3 pressure ulcer and right great toe neuropathic ulcer  2-1-19: here for wound care for right medial heel stage 3 pressure ulcer and neuropathic ulcer to right great toe.  2-8-19: here for wound care for right medial heel stage 3 pressure ulcer  2-15-19: here for wound care for right heel pressure ulcer  2-22-19: here for wound care for right heel pressure ulcer  3-1-19: here for wound care for right heel pressure ulcer  3-8-19: here for wound care for right heel pressure  ulcer  3-15-19: here for wound care for right stage 3 pressure ulcer  3-22-19: here for wound care for stage 3 pressure ulcer to her right heel  3-29-19: here for wound care for stage 3 pressure ulcer to right heel  4-5-19: here for wound care for stage 3 pressure ulcer to right heel.  4-26-19: here for wound care for stage 3 pressure ulcer to right heel and a new neuropathic ulcer to right great toe.  5-3-19: here for wound care for stage 3 pressure ulcer to right heel and a  neuropathic ulcer to right great toe.  5-17-19: here for wound care for stage 3 pressure ulcer to right heel, right toe is healed today  5-24-19: here for wound care for stage 3 pressure ulcer to right heel  5-31-19: here for wound care stage 3 pressure ulcer to right heel             Physical Exam   Constitutional: She is oriented to person, place, and time. She appears well-developed and well-nourished.   HENT:   Head: Normocephalic.   Pulmonary/Chest: Effort normal.   Musculoskeletal:   Pt is paraplegic due to spina bifida   Neurological: She is alert and oriented to person, place, and time.   Skin: Skin is warm and dry.   Stage 3 pressure ulcer to right heel is healing and superficial today   Psychiatric: She has a normal mood and affect. Her behavior is normal. Judgment and thought content normal.   Vitals reviewed.    Wound is healing well, will continue collagen and hydrafera blue q 3 days, will recheck in 2 weeks.  See wound doc progress notes. Documents will be scanned.        Hyacinth CastleBoeuf  [facility]

## 2019-06-14 ENCOUNTER — OFFICE VISIT (OUTPATIENT)
Dept: WOUND CARE | Facility: HOSPITAL | Age: 34
End: 2019-06-14
Attending: NURSE PRACTITIONER
Payer: MEDICAID

## 2019-06-14 VITALS — DIASTOLIC BLOOD PRESSURE: 88 MMHG | HEART RATE: 81 BPM | RESPIRATION RATE: 18 BRPM | SYSTOLIC BLOOD PRESSURE: 116 MMHG

## 2019-06-14 DIAGNOSIS — Q05.2 SPINA BIFIDA WITH HYDROCEPHALUS, LUMBAR REGION: ICD-10-CM

## 2019-06-14 DIAGNOSIS — L89.613 STAGE III PRESSURE ULCER OF RIGHT HEEL: Primary | ICD-10-CM

## 2019-06-14 PROCEDURE — 11042 DBRDMT SUBQ TIS 1ST 20SQCM/<: CPT

## 2019-06-14 PROCEDURE — 27201912 HC WOUND CARE DEBRIDEMENT SUPPLIES

## 2019-06-14 NOTE — PROGRESS NOTES
[facility]  Wound Care  Progress Note    Problem List Items Addressed This Visit        Neuro    Spina bifida with hydrocephalus, lumbar region       Derm    Stage III pressure ulcer of right heel - Primary    Overview     HPI: 34 yr old female with history of hydrocephalus, in a wheelchair. Developed a stage 3 pressure ulcer to right heel due to lack of sensation    Location: right heel     Duration: 9-14-18     Context: heel is  Pressure     Associated Signs & Symptoms: denies pain    Timing: n/a    Severity: n/a    Quality: n/a    Modifying Factors: n/a    10-26-18: Here for wound care to right medial heel and right 4th toe wound  11-9-18: here for wound care to right medial heel and right 4th toe wounds  11-16-18: here for wound care for right medial heel wound  11-30-18: here for wound care for right medial heel pressure ulcer.  12-7-18: here for wound care for right medial heel pressure ulcer.  12-14-18: here for wound care to right medial heel  12-21-18:  Here for wound care to right medial heel.  12-28-18: here for wound care to right medial heel pressure ulcer  1-4-19: here for wound care to right medial heel pressure ulcer  1-11-19: here for wound care to right medial heel pressure ulcer and a neuropathic ulcer to her right great toe that has been there for over a month.  1-18-19: here for wound care to right medial pressure ulcer and right great toe neuropathic ulcer  1-25-19: here for wound care for right medial heel stage 3 pressure ulcer and right great toe neuropathic ulcer  2-1-19: here for wound care for right medial heel stage 3 pressure ulcer and neuropathic ulcer to right great toe.  2-8-19: here for wound care for right medial heel stage 3 pressure ulcer  2-15-19: here for wound care for right heel pressure ulcer  2-22-19: here for wound care for right heel pressure ulcer  3-1-19: here for wound care for right heel pressure ulcer  3-8-19: here for wound care for right heel  pressure ulcer  3-15-19: here for wound care for right stage 3 pressure ulcer  3-22-19: here for wound care for stage 3 pressure ulcer to her right heel  3-29-19: here for wound care for stage 3 pressure ulcer to right heel  4-5-19: here for wound care for stage 3 pressure ulcer to right heel.  4-26-19: here for wound care for stage 3 pressure ulcer to right heel and a new neuropathic ulcer to right great toe.  5-3-19: here for wound care for stage 3 pressure ulcer to right heel and a  neuropathic ulcer to right great toe.  5-17-19: here for wound care for stage 3 pressure ulcer to right heel, right toe is healed today  5-24-19: here for wound care for stage 3 pressure ulcer to right heel  5-31-19: here for wound care stage 3 pressure ulcer to right heel  6-14-19: here for wound care for stage 3 pressure ulcer to her right heel.           Physical Exam   Constitutional: She is oriented to person, place, and time. She appears well-developed and well-nourished.   HENT:   Head: Normocephalic.   Pulmonary/Chest: Effort normal.   Musculoskeletal:   Pt is paraplegic due to spina bifida   Neurological: She is alert and oriented to person, place, and time.   Skin: Skin is warm and dry.   Stage 3 pressure ulcer to right heel with adipose exposed.   Psychiatric: She has a normal mood and affect. Her behavior is normal. Judgment and thought content normal.   Vitals reviewed.      Ulcer debrided and will continue collagen and hydrofera blue q 3 days, will recheck in one week.  See wound doc progress notes. Documents will be scanned.        Hyacinth Aguero  [facility]

## 2019-06-21 ENCOUNTER — OFFICE VISIT (OUTPATIENT)
Dept: WOUND CARE | Facility: HOSPITAL | Age: 34
End: 2019-06-21
Attending: NURSE PRACTITIONER
Payer: MEDICAID

## 2019-06-21 VITALS
RESPIRATION RATE: 18 BRPM | HEART RATE: 75 BPM | DIASTOLIC BLOOD PRESSURE: 98 MMHG | TEMPERATURE: 98 F | SYSTOLIC BLOOD PRESSURE: 116 MMHG

## 2019-06-21 DIAGNOSIS — Q05.2 SPINA BIFIDA WITH HYDROCEPHALUS, LUMBAR REGION: Primary | ICD-10-CM

## 2019-06-21 DIAGNOSIS — L89.613 STAGE III PRESSURE ULCER OF RIGHT HEEL: ICD-10-CM

## 2019-06-21 PROCEDURE — 99212 OFFICE O/P EST SF 10 MIN: CPT

## 2019-06-21 NOTE — PROGRESS NOTES
[facility]  Wound Care  Progress Note    Problem List Items Addressed This Visit        Neuro    Spina bifida with hydrocephalus, lumbar region - Primary       Derm    Stage III pressure ulcer of right heel    Overview     HPI: 34 yr old female with history of hydrocephalus, in a wheelchair. Developed a stage 3 pressure ulcer to right heel due to lack of sensation    Location: right heel     Duration: 9-14-18     Context: heel is  Pressure     Associated Signs & Symptoms: denies pain    Timing: n/a    Severity: n/a    Quality: n/a    Modifying Factors: n/a    10-26-18: Here for wound care to right medial heel and right 4th toe wound  11-9-18: here for wound care to right medial heel and right 4th toe wounds  11-16-18: here for wound care for right medial heel wound  11-30-18: here for wound care for right medial heel pressure ulcer.  12-7-18: here for wound care for right medial heel pressure ulcer.  12-14-18: here for wound care to right medial heel  12-21-18:  Here for wound care to right medial heel.  12-28-18: here for wound care to right medial heel pressure ulcer  1-4-19: here for wound care to right medial heel pressure ulcer  1-11-19: here for wound care to right medial heel pressure ulcer and a neuropathic ulcer to her right great toe that has been there for over a month.  1-18-19: here for wound care to right medial pressure ulcer and right great toe neuropathic ulcer  1-25-19: here for wound care for right medial heel stage 3 pressure ulcer and right great toe neuropathic ulcer  2-1-19: here for wound care for right medial heel stage 3 pressure ulcer and neuropathic ulcer to right great toe.  2-8-19: here for wound care for right medial heel stage 3 pressure ulcer  2-15-19: here for wound care for right heel pressure ulcer  2-22-19: here for wound care for right heel pressure ulcer  3-1-19: here for wound care for right heel pressure ulcer  3-8-19: here for wound care for right heel  pressure ulcer  3-15-19: here for wound care for right stage 3 pressure ulcer  3-22-19: here for wound care for stage 3 pressure ulcer to her right heel  3-29-19: here for wound care for stage 3 pressure ulcer to right heel  4-5-19: here for wound care for stage 3 pressure ulcer to right heel.  4-26-19: here for wound care for stage 3 pressure ulcer to right heel and a new neuropathic ulcer to right great toe.  5-3-19: here for wound care for stage 3 pressure ulcer to right heel and a  neuropathic ulcer to right great toe.  5-17-19: here for wound care for stage 3 pressure ulcer to right heel, right toe is healed today  5-24-19: here for wound care for stage 3 pressure ulcer to right heel  5-31-19: here for wound care stage 3 pressure ulcer to right heel  6-14-19: here for wound care for stage 3 pressure ulcer to her right heel.  6-21-19: here for wound care for stage 3 pressure ulcer to right heel             Physical Exam   Constitutional: She is oriented to person, place, and time. She appears well-developed and well-nourished.   HENT:   Head: Normocephalic.   Pulmonary/Chest: Effort normal.   Musculoskeletal:   Pt in a wheelchair due to spina bifida   Neurological: She is alert and oriented to person, place, and time.   Skin: Skin is warm and dry.   pressure ulcer stage 3 to right heel is resolved today   Psychiatric: She has a normal mood and affect. Her behavior is normal. Judgment and thought content normal.   Vitals reviewed.     wound is resolved, gave pt info regarding resources for spina bifida such as braces and special shoes, call with questions or concerns, discharge from clinic.  See wound doc progress notes. Documents will be scanned.        Laina LeBoeuf [facility]

## 2021-01-21 ENCOUNTER — OFFICE VISIT (OUTPATIENT)
Dept: URGENT CARE | Facility: CLINIC | Age: 36
End: 2021-01-21
Payer: MEDICAID

## 2021-01-21 VITALS
HEART RATE: 86 BPM | WEIGHT: 155 LBS | RESPIRATION RATE: 18 BRPM | OXYGEN SATURATION: 100 % | HEIGHT: 58 IN | DIASTOLIC BLOOD PRESSURE: 80 MMHG | TEMPERATURE: 98 F | SYSTOLIC BLOOD PRESSURE: 132 MMHG | BODY MASS INDEX: 32.54 KG/M2

## 2021-01-21 DIAGNOSIS — R05.9 COUGH: ICD-10-CM

## 2021-01-21 DIAGNOSIS — R11.0 NAUSEA: ICD-10-CM

## 2021-01-21 DIAGNOSIS — B34.9 VIRAL SYNDROME: Primary | ICD-10-CM

## 2021-01-21 LAB
CTP QC/QA: YES
SARS-COV-2 RDRP RESP QL NAA+PROBE: NEGATIVE

## 2021-01-21 PROCEDURE — U0002 COVID-19 LAB TEST NON-CDC: HCPCS | Mod: QW,S$GLB,, | Performed by: NURSE PRACTITIONER

## 2021-01-21 PROCEDURE — U0002: ICD-10-PCS | Mod: QW,S$GLB,, | Performed by: NURSE PRACTITIONER

## 2021-01-21 PROCEDURE — 99203 OFFICE O/P NEW LOW 30 MIN: CPT | Mod: S$GLB,,, | Performed by: NURSE PRACTITIONER

## 2021-01-21 PROCEDURE — 99203 PR OFFICE/OUTPT VISIT, NEW, LEVL III, 30-44 MIN: ICD-10-PCS | Mod: S$GLB,,, | Performed by: NURSE PRACTITIONER

## 2021-01-21 RX ORDER — LEVOCETIRIZINE DIHYDROCHLORIDE 5 MG/1
5 TABLET, FILM COATED ORAL NIGHTLY
COMMUNITY
End: 2021-06-09

## 2021-01-21 RX ORDER — SERTRALINE HYDROCHLORIDE 100 MG/1
150 TABLET, FILM COATED ORAL DAILY
COMMUNITY

## 2021-01-21 RX ORDER — ACETAMINOPHEN 325 MG/1
TABLET ORAL
COMMUNITY
Start: 2020-11-17 | End: 2021-06-09

## 2021-01-21 RX ORDER — OMEPRAZOLE 40 MG/1
40 CAPSULE, DELAYED RELEASE ORAL DAILY
COMMUNITY
End: 2021-06-09

## 2021-01-21 RX ORDER — TRAZODONE HYDROCHLORIDE 150 MG/1
150 TABLET ORAL NIGHTLY
COMMUNITY
End: 2021-06-09

## 2021-01-21 RX ORDER — CLONAZEPAM 0.5 MG/1
0.5 TABLET ORAL 2 TIMES DAILY PRN
COMMUNITY
End: 2021-06-09

## 2021-01-21 RX ORDER — ONDANSETRON 4 MG/1
4 TABLET, ORALLY DISINTEGRATING ORAL EVERY 8 HOURS PRN
Qty: 10 TABLET | Refills: 0 | Status: SHIPPED | OUTPATIENT
Start: 2021-01-21 | End: 2021-06-09

## 2021-05-04 ENCOUNTER — PATIENT MESSAGE (OUTPATIENT)
Dept: RESEARCH | Facility: HOSPITAL | Age: 36
End: 2021-05-04

## 2021-05-19 ENCOUNTER — TELEPHONE (OUTPATIENT)
Dept: SURGERY | Facility: CLINIC | Age: 36
End: 2021-05-19

## 2021-06-08 ENCOUNTER — TELEPHONE (OUTPATIENT)
Dept: SURGERY | Facility: CLINIC | Age: 36
End: 2021-06-08

## 2021-06-09 ENCOUNTER — TELEPHONE (OUTPATIENT)
Dept: UROGYNECOLOGY | Facility: CLINIC | Age: 36
End: 2021-06-09

## 2021-06-09 ENCOUNTER — OFFICE VISIT (OUTPATIENT)
Dept: UROGYNECOLOGY | Facility: CLINIC | Age: 36
End: 2021-06-09
Payer: MEDICAID

## 2021-06-09 ENCOUNTER — OFFICE VISIT (OUTPATIENT)
Dept: SURGERY | Facility: OTHER | Age: 36
End: 2021-06-09
Attending: COLON & RECTAL SURGERY
Payer: MEDICAID

## 2021-06-09 VITALS
BODY MASS INDEX: 32.54 KG/M2 | SYSTOLIC BLOOD PRESSURE: 119 MMHG | BODY MASS INDEX: 32.4 KG/M2 | DIASTOLIC BLOOD PRESSURE: 80 MMHG | SYSTOLIC BLOOD PRESSURE: 119 MMHG | WEIGHT: 155 LBS | HEIGHT: 58 IN | HEART RATE: 82 BPM | HEIGHT: 58 IN | HEART RATE: 88 BPM | DIASTOLIC BLOOD PRESSURE: 80 MMHG

## 2021-06-09 DIAGNOSIS — Z30.9 ENCOUNTER FOR CONTRACEPTIVE MANAGEMENT, UNSPECIFIED TYPE: ICD-10-CM

## 2021-06-09 DIAGNOSIS — Q05.9 SPINA BIFIDA WITHOUT HYDROCEPHALUS, UNSPECIFIED SPINAL REGION: ICD-10-CM

## 2021-06-09 DIAGNOSIS — Q05.2 SPINA BIFIDA WITH HYDROCEPHALUS, LUMBAR REGION: Primary | ICD-10-CM

## 2021-06-09 DIAGNOSIS — G54.1 LUMBOSACRAL PLEXUS DISORDERS: ICD-10-CM

## 2021-06-09 DIAGNOSIS — M95.5 ACQUIRED DEFORMITY OF PELVIS: ICD-10-CM

## 2021-06-09 DIAGNOSIS — N81.4 UTERINE PROLAPSE: Primary | ICD-10-CM

## 2021-06-09 DIAGNOSIS — N94.10 DYSPAREUNIA IN FEMALE: ICD-10-CM

## 2021-06-09 PROCEDURE — 99999 PR PBB SHADOW E&M-EST. PATIENT-LVL IV: ICD-10-PCS | Mod: PBBFAC,,, | Performed by: COLON & RECTAL SURGERY

## 2021-06-09 PROCEDURE — 99204 PR OFFICE/OUTPT VISIT, NEW, LEVL IV, 45-59 MIN: ICD-10-PCS | Mod: S$PBB,,, | Performed by: PHYSICIAN ASSISTANT

## 2021-06-09 PROCEDURE — 99215 OFFICE O/P EST HI 40 MIN: CPT | Mod: PBBFAC | Performed by: PHYSICIAN ASSISTANT

## 2021-06-09 PROCEDURE — 99999 PR PBB SHADOW E&M-EST. PATIENT-LVL IV: CPT | Mod: PBBFAC,,, | Performed by: COLON & RECTAL SURGERY

## 2021-06-09 PROCEDURE — 99214 OFFICE O/P EST MOD 30 MIN: CPT | Mod: PBBFAC,27 | Performed by: COLON & RECTAL SURGERY

## 2021-06-09 PROCEDURE — 99999 PR PBB SHADOW E&M-EST. PATIENT-LVL V: ICD-10-PCS | Mod: PBBFAC,,, | Performed by: PHYSICIAN ASSISTANT

## 2021-06-09 PROCEDURE — 99203 PR OFFICE/OUTPT VISIT, NEW, LEVL III, 30-44 MIN: ICD-10-PCS | Mod: S$PBB,,, | Performed by: COLON & RECTAL SURGERY

## 2021-06-09 PROCEDURE — 99203 OFFICE O/P NEW LOW 30 MIN: CPT | Mod: S$PBB,,, | Performed by: COLON & RECTAL SURGERY

## 2021-06-09 PROCEDURE — 99204 OFFICE O/P NEW MOD 45 MIN: CPT | Mod: S$PBB,,, | Performed by: PHYSICIAN ASSISTANT

## 2021-06-09 PROCEDURE — 99999 PR PBB SHADOW E&M-EST. PATIENT-LVL V: CPT | Mod: PBBFAC,,, | Performed by: PHYSICIAN ASSISTANT

## 2021-06-09 RX ORDER — NORELGESTROMIN AND ETHINYL ESTRADIOL 150; 35 UG/D; UG/D
1 PATCH TRANSDERMAL WEEKLY
Qty: 4 PATCH | Refills: 11 | Status: SHIPPED | OUTPATIENT
Start: 2021-06-09 | End: 2021-10-06

## 2021-06-09 RX ORDER — IBUPROFEN 800 MG/1
800 TABLET ORAL EVERY 8 HOURS PRN
COMMUNITY
Start: 2021-05-26 | End: 2021-10-06

## 2021-06-09 RX ORDER — ACETAMINOPHEN, DIPHENHYDRAMINE HCL, PHENYLEPHRINE HCL 325; 25; 5 MG/1; MG/1; MG/1
TABLET ORAL
COMMUNITY

## 2021-06-09 RX ORDER — ALPRAZOLAM 0.5 MG/1
TABLET ORAL
COMMUNITY

## 2021-06-09 RX ORDER — MONTELUKAST SODIUM 10 MG/1
TABLET ORAL
COMMUNITY
End: 2023-11-21

## 2021-06-09 RX ORDER — TRAZODONE HYDROCHLORIDE 150 MG/1
TABLET ORAL
COMMUNITY

## 2021-06-28 ENCOUNTER — TELEPHONE (OUTPATIENT)
Dept: UROGYNECOLOGY | Facility: CLINIC | Age: 36
End: 2021-06-28

## 2021-10-06 ENCOUNTER — OFFICE VISIT (OUTPATIENT)
Dept: UROGYNECOLOGY | Facility: CLINIC | Age: 36
End: 2021-10-06
Payer: MEDICAID

## 2021-10-06 DIAGNOSIS — N94.10 DYSPAREUNIA IN FEMALE: Primary | ICD-10-CM

## 2021-10-06 PROCEDURE — 99213 OFFICE O/P EST LOW 20 MIN: CPT | Mod: 95,,, | Performed by: PHYSICIAN ASSISTANT

## 2021-10-06 PROCEDURE — 99213 PR OFFICE/OUTPT VISIT, EST, LEVL III, 20-29 MIN: ICD-10-PCS | Mod: 95,,, | Performed by: PHYSICIAN ASSISTANT

## 2023-04-28 ENCOUNTER — OFFICE VISIT (OUTPATIENT)
Dept: WOUND CARE | Facility: HOSPITAL | Age: 38
End: 2023-04-28
Attending: NURSE PRACTITIONER
Payer: MEDICAID

## 2023-04-28 VITALS
SYSTOLIC BLOOD PRESSURE: 111 MMHG | RESPIRATION RATE: 17 BRPM | TEMPERATURE: 98 F | HEART RATE: 81 BPM | DIASTOLIC BLOOD PRESSURE: 78 MMHG

## 2023-04-28 DIAGNOSIS — L89.324 LEFT ISCHIAL PRESSURE SORE, STAGE IV: ICD-10-CM

## 2023-04-28 DIAGNOSIS — L89.153 STAGE III PRESSURE ULCER OF SACRAL REGION: ICD-10-CM

## 2023-04-28 DIAGNOSIS — L89.620 PRESSURE ULCER, HEEL, LEFT, UNSTAGEABLE: ICD-10-CM

## 2023-04-28 DIAGNOSIS — L89.890 PRESSURE INJURY OF RIGHT FOOT, UNSTAGEABLE: ICD-10-CM

## 2023-04-28 PROCEDURE — 87075 CULTR BACTERIA EXCEPT BLOOD: CPT | Performed by: NURSE PRACTITIONER

## 2023-04-28 PROCEDURE — 87077 CULTURE AEROBIC IDENTIFY: CPT | Mod: 59 | Performed by: NURSE PRACTITIONER

## 2023-04-28 PROCEDURE — 87186 SC STD MICRODIL/AGAR DIL: CPT | Performed by: NURSE PRACTITIONER

## 2023-04-28 PROCEDURE — 11045 DBRDMT SUBQ TISS EACH ADDL: CPT

## 2023-04-28 PROCEDURE — 99214 OFFICE O/P EST MOD 30 MIN: CPT | Mod: 25

## 2023-04-28 PROCEDURE — 11042 DBRDMT SUBQ TIS 1ST 20SQCM/<: CPT

## 2023-04-28 PROCEDURE — 87076 CULTURE ANAEROBE IDENT EACH: CPT | Performed by: NURSE PRACTITIONER

## 2023-04-28 PROCEDURE — 87070 CULTURE OTHR SPECIMN AEROBIC: CPT | Performed by: NURSE PRACTITIONER

## 2023-04-28 NOTE — PROGRESS NOTES
[facility]  Wound Care  Progress Note    Problem List Items Addressed This Visit          Orthopedic    Pressure injury of right foot, unstageable    Pressure ulcer, heel, left, unstageable    Relevant Orders    CULTURE, AEROBIC  (SPECIFY SOURCE)    CULTURE, ANAEROBE    Left ischial pressure sore, stage IV    Overview     HPI: 38 yr old female with history of spina bifida, does not walk, was seen in the wound clinic years ago but recently hospitalized in [place] for multiple pressure ulcers, she presents today for wound care.    Location: left plantar heel extending to posterior ankle, right plantar mid foot, sacrum , left ischium    Duration: 3-27-23    Context: pressure    Associated Signs & Symptoms: denies pain    Timing: n/a    Severity: n/a    Quality: n/a    Modifying Factors: n/a               Stage III pressure ulcer of sacral region     Physical Exam  Vitals reviewed.   Constitutional:       Appearance: Normal appearance.   HENT:      Head: Normocephalic.   Pulmonary:      Effort: Pulmonary effort is normal.   Musculoskeletal:      Comments: In a wheelchair d/t spina bifida   Skin:     General: Skin is warm and dry.      Comments: Unstageable pressure ulcers to left heel, right plantar foot both with eschar, stage 3 pressure ulcer to sacrum with adipose exposed, stage 4 pressure ulcer to left ischium with adipose exposed.   Neurological:      Mental Status: She is alert and oriented to person, place, and time.   Psychiatric:         Mood and Affect: Mood normal.         Behavior: Behavior normal.         Thought Content: Thought content normal.         Judgment: Judgment normal.   Ulcers debrided, culture obtained of left heel, will use santyl and dakins moistened guaze daily, silver alginate qod to sacrum and left ischium. Pt instructed to offload pressure and float heels, turn at least q 2 hrs. Keep clean and dry, will try to get pt home health.   See wound doc progress notes.  Documents will be scanned.        Hyacinth CastleBoeuf  [facility]

## 2023-05-02 LAB
BACTERIA SPEC AEROBE CULT: ABNORMAL

## 2023-05-03 LAB
BACTERIA SPEC ANAEROBE CULT: ABNORMAL
BACTERIA SPEC ANAEROBE CULT: ABNORMAL

## 2023-05-12 ENCOUNTER — OFFICE VISIT (OUTPATIENT)
Dept: WOUND CARE | Facility: HOSPITAL | Age: 38
End: 2023-05-12
Attending: NURSE PRACTITIONER
Payer: MEDICAID

## 2023-05-12 VITALS
RESPIRATION RATE: 17 BRPM | TEMPERATURE: 98 F | DIASTOLIC BLOOD PRESSURE: 76 MMHG | HEART RATE: 78 BPM | SYSTOLIC BLOOD PRESSURE: 110 MMHG

## 2023-05-12 DIAGNOSIS — L89.153 STAGE III PRESSURE ULCER OF SACRAL REGION: ICD-10-CM

## 2023-05-12 DIAGNOSIS — L89.613 STAGE III PRESSURE ULCER OF RIGHT HEEL: Primary | ICD-10-CM

## 2023-05-12 DIAGNOSIS — L89.324 LEFT ISCHIAL PRESSURE SORE, STAGE IV: ICD-10-CM

## 2023-05-12 DIAGNOSIS — L89.620 PRESSURE ULCER, HEEL, LEFT, UNSTAGEABLE: ICD-10-CM

## 2023-05-12 DIAGNOSIS — L89.893 PRESSURE INJURY OF RIGHT FOOT, STAGE 3: ICD-10-CM

## 2023-05-12 DIAGNOSIS — L89.323 STAGE III PRESSURE ULCER OF LEFT BUTTOCK: ICD-10-CM

## 2023-05-12 DIAGNOSIS — L89.890 PRESSURE INJURY OF RIGHT FOOT, UNSTAGEABLE: ICD-10-CM

## 2023-05-12 DIAGNOSIS — L89.313 STAGE III PRESSURE ULCER OF RIGHT BUTTOCK: ICD-10-CM

## 2023-05-12 PROCEDURE — 11042 DBRDMT SUBQ TIS 1ST 20SQCM/<: CPT

## 2023-05-12 PROCEDURE — 11045 DBRDMT SUBQ TISS EACH ADDL: CPT

## 2023-05-12 NOTE — PROGRESS NOTES
[facility]  Wound Care  Progress Note    Problem List Items Addressed This Visit          Orthopedic    Pressure injury of right foot, unstageable    Relevant Orders    COMPREHENSIVE METABOLIC PANEL    CBC W/ AUTO DIFFERENTIAL    Sedimentation rate    C-REACTIVE PROTEIN    Pressure ulcer, heel, left, unstageable    Relevant Orders    COMPREHENSIVE METABOLIC PANEL    CBC W/ AUTO DIFFERENTIAL    Sedimentation rate    C-REACTIVE PROTEIN    Left ischial pressure sore, stage IV    Overview     HPI: 38 yr old female with history of spina bifida, does not walk, was seen in the wound clinic years ago but recently hospitalized in [place] for multiple pressure ulcers, she presents today for wound care.    Location: left plantar heel extending to posterior ankle, right plantar mid foot, sacrum , left ischium    Duration: 3-27-23    Context: pressure    Associated Signs & Symptoms: denies pain    Timing: n/a    Severity: n/a    Quality: n/a    Modifying Factors: n/a    5-12-23; here for wound care for multiple pressure ulcers.           Stage III pressure ulcer of sacral region    RESOLVED: Stage III pressure ulcer of right heel - Primary    Overview     HPI: 34 yr old female with history of hydrocephalus, in a wheelchair. Developed a stage 3 pressure ulcer to right heel due to lack of sensation    Location: right heel     Duration: 9-14-18     Context: heel is  Pressure     Associated Signs & Symptoms: denies pain    Timing: n/a    Severity: n/a    Quality: n/a    Modifying Factors: n/a    10-26-18: Here for wound care to right medial heel and right 4th toe wound  11-9-18: here for wound care to right medial heel and right 4th toe wounds  11-16-18: here for wound care for right medial heel wound  11-30-18: here for wound care for right medial heel pressure ulcer.  12-7-18: here for wound care for right medial heel pressure ulcer.  12-14-18: here for wound care to right medial heel  12-21-18:  Here for  wound care to right medial heel.  12-28-18: here for wound care to right medial heel pressure ulcer  1-4-19: here for wound care to right medial heel pressure ulcer  1-11-19: here for wound care to right medial heel pressure ulcer and a neuropathic ulcer to her right great toe that has been there for over a month.  1-18-19: here for wound care to right medial pressure ulcer and right great toe neuropathic ulcer  1-25-19: here for wound care for right medial heel stage 3 pressure ulcer and right great toe neuropathic ulcer  2-1-19: here for wound care for right medial heel stage 3 pressure ulcer and neuropathic ulcer to right great toe.  2-8-19: here for wound care for right medial heel stage 3 pressure ulcer  2-15-19: here for wound care for right heel pressure ulcer  2-22-19: here for wound care for right heel pressure ulcer  3-1-19: here for wound care for right heel pressure ulcer  3-8-19: here for wound care for right heel pressure ulcer  3-15-19: here for wound care for right stage 3 pressure ulcer  3-22-19: here for wound care for stage 3 pressure ulcer to her right heel  3-29-19: here for wound care for stage 3 pressure ulcer to right heel  4-5-19: here for wound care for stage 3 pressure ulcer to right heel.  4-26-19: here for wound care for stage 3 pressure ulcer to right heel and a new neuropathic ulcer to right great toe.  5-3-19: here for wound care for stage 3 pressure ulcer to right heel and a  neuropathic ulcer to right great toe.  5-17-19: here for wound care for stage 3 pressure ulcer to right heel, right toe is healed today  5-24-19: here for wound care for stage 3 pressure ulcer to right heel  5-31-19: here for wound care stage 3 pressure ulcer to right heel  6-14-19: here for wound care for stage 3 pressure ulcer to her right heel.  6-21-19: here for wound care for stage 3 pressure ulcer to right heel          Physical Exam  Vitals reviewed.   Constitutional:       Appearance: Normal appearance.    HENT:      Head: Normocephalic.   Pulmonary:      Effort: Pulmonary effort is normal.   Musculoskeletal:      Comments: Pt in a wheelchair   Skin:     General: Skin is warm and dry.      Comments: Unstageable pressure ulcers to left plantar heel extending to posterior ankle, stage 3 pressure ulcer to right plantar mid foot, sacrum , left ischium, all with adipose exposed.     Neurological:      Mental Status: She is alert and oriented to person, place, and time. Mental status is at baseline.   Psychiatric:         Mood and Affect: Mood normal.         Behavior: Behavior normal.         Thought Content: Thought content normal.         Judgment: Judgment normal.     Wounds debrided , looking better today, continue santyl and topical abx powder to right foot daily, left foot use santyl and topical abx wet to dry gauze daily, silver alginate to left ischium and santyl to ulcers to buttocks daily, must offload pressure often and avoid sitting for > 2 hrs at a time. Will send for labs to assess inflammatory markers.   See wound doc progress notes. Documents will be scanned.        Laina LeBoeuf [facility]

## 2023-05-19 ENCOUNTER — OFFICE VISIT (OUTPATIENT)
Dept: WOUND CARE | Facility: HOSPITAL | Age: 38
End: 2023-05-19
Attending: NURSE PRACTITIONER
Payer: MEDICAID

## 2023-05-19 VITALS
DIASTOLIC BLOOD PRESSURE: 80 MMHG | TEMPERATURE: 98 F | RESPIRATION RATE: 18 BRPM | HEART RATE: 80 BPM | SYSTOLIC BLOOD PRESSURE: 130 MMHG

## 2023-05-19 DIAGNOSIS — Q05.2 SPINA BIFIDA WITH HYDROCEPHALUS, LUMBAR REGION: ICD-10-CM

## 2023-05-19 DIAGNOSIS — L89.323 STAGE III PRESSURE ULCER OF LEFT BUTTOCK: ICD-10-CM

## 2023-05-19 DIAGNOSIS — L89.613 STAGE III PRESSURE ULCER OF RIGHT HEEL: ICD-10-CM

## 2023-05-19 DIAGNOSIS — L89.893 PRESSURE INJURY OF RIGHT FOOT, STAGE 3: ICD-10-CM

## 2023-05-19 DIAGNOSIS — L89.890 PRESSURE INJURY OF RIGHT FOOT, UNSTAGEABLE: ICD-10-CM

## 2023-05-19 DIAGNOSIS — L89.313 STAGE III PRESSURE ULCER OF RIGHT BUTTOCK: ICD-10-CM

## 2023-05-19 DIAGNOSIS — L89.623 STAGE III PRESSURE ULCER OF LEFT HEEL: ICD-10-CM

## 2023-05-19 DIAGNOSIS — L89.153 STAGE III PRESSURE ULCER OF SACRAL REGION: ICD-10-CM

## 2023-05-19 DIAGNOSIS — L89.324 LEFT ISCHIAL PRESSURE SORE, STAGE IV: Primary | ICD-10-CM

## 2023-05-19 PROCEDURE — 11042 DBRDMT SUBQ TIS 1ST 20SQCM/<: CPT

## 2023-05-19 NOTE — PROGRESS NOTES
[facility]  Wound Care  Progress Note    Problem List Items Addressed This Visit          Orthopedic    Pressure ulcer, heel, left, unstageable    Left ischial pressure sore, stage IV - Primary    Overview     HPI: 38 yr old female with history of spina bifida, does not walk, was seen in the wound clinic years ago but recently hospitalized in [place] for multiple pressure ulcers, she presents today for wound care.    Location: left plantar heel extending to posterior ankle, right plantar mid foot, sacrum , left ischium, left buttocks, right buttocks    Duration: 3-27-23, left and right buttocks: 5-8-23    Context: pressure    Associated Signs & Symptoms: denies pain    Timing: n/a    Severity: n/a    Quality: n/a    Modifying Factors: n/a    5-12-23; here for wound care for multiple pressure ulcers. She has several new ones to her buttocks.  5-19-23: here for wound care for multiple pressure ulcers            Stage III pressure ulcer of left buttock    Stage III pressure ulcer of right buttock     Other Visit Diagnoses       Stage III pressure ulcer of right heel        Pressure injury of right foot, unstageable            Physical Exam  Vitals reviewed.   Constitutional:       Appearance: Normal appearance.   HENT:      Head: Normocephalic.   Musculoskeletal:      Comments: In a wheelchair d/t spina bifida   Skin:     General: Skin is warm and dry.      Comments: Unstageable pressure ulcers to left plantar heel extending to posterior ankle is a stage 3 pressure ulcer today,  stage 3 pressure ulcer to right plantar mid foot,  left ischium, all with adipose exposed. Sacrum is healed.   Neurological:      Mental Status: She is alert and oriented to person, place, and time.   Psychiatric:         Mood and Affect: Mood normal.         Behavior: Behavior normal.         Thought Content: Thought content normal.         Judgment: Judgment normal.     Ulcers debrided, doing well and all are better,  continue santyl to feet and cover with abx wet to dry gauze daily, silver alginate qod to ischium and santyl to buttocks daily, pt is not offloading pressure as she should, she removed her wheelchair cushion in order to be lower to the ground to  things, encouraged to offload pressure and will recheck in 2 weeks, her potential to heal is good.    See wound doc progress notes. Documents will be scanned.        Hyacinth Aguero  [facility]

## 2023-06-02 ENCOUNTER — OFFICE VISIT (OUTPATIENT)
Dept: WOUND CARE | Facility: HOSPITAL | Age: 38
End: 2023-06-02
Attending: NURSE PRACTITIONER
Payer: MEDICAID

## 2023-06-02 VITALS
RESPIRATION RATE: 20 BRPM | TEMPERATURE: 98 F | HEART RATE: 68 BPM | SYSTOLIC BLOOD PRESSURE: 130 MMHG | DIASTOLIC BLOOD PRESSURE: 68 MMHG

## 2023-06-02 DIAGNOSIS — L89.313 STAGE III PRESSURE ULCER OF RIGHT BUTTOCK: ICD-10-CM

## 2023-06-02 DIAGNOSIS — Q05.2 SPINA BIFIDA WITH HYDROCEPHALUS, LUMBAR REGION: ICD-10-CM

## 2023-06-02 DIAGNOSIS — L89.512 STAGE II PRESSURE ULCER OF RIGHT ANKLE: ICD-10-CM

## 2023-06-02 DIAGNOSIS — L89.623 STAGE III PRESSURE ULCER OF LEFT HEEL: ICD-10-CM

## 2023-06-02 DIAGNOSIS — L89.324 LEFT ISCHIAL PRESSURE SORE, STAGE IV: Primary | ICD-10-CM

## 2023-06-02 DIAGNOSIS — L89.153 STAGE III PRESSURE ULCER OF SACRAL REGION: ICD-10-CM

## 2023-06-02 DIAGNOSIS — L89.613 STAGE III PRESSURE ULCER OF RIGHT HEEL: ICD-10-CM

## 2023-06-02 DIAGNOSIS — L89.323 STAGE III PRESSURE ULCER OF LEFT BUTTOCK: ICD-10-CM

## 2023-06-02 PROCEDURE — 11042 DBRDMT SUBQ TIS 1ST 20SQCM/<: CPT

## 2023-06-02 PROCEDURE — 99213 OFFICE O/P EST LOW 20 MIN: CPT

## 2023-06-09 ENCOUNTER — OFFICE VISIT (OUTPATIENT)
Dept: WOUND CARE | Facility: HOSPITAL | Age: 38
End: 2023-06-09
Attending: NURSE PRACTITIONER
Payer: MEDICAID

## 2023-06-09 VITALS
RESPIRATION RATE: 17 BRPM | HEART RATE: 70 BPM | DIASTOLIC BLOOD PRESSURE: 70 MMHG | SYSTOLIC BLOOD PRESSURE: 126 MMHG | TEMPERATURE: 98 F

## 2023-06-09 DIAGNOSIS — L89.323 STAGE III PRESSURE ULCER OF LEFT BUTTOCK: ICD-10-CM

## 2023-06-09 DIAGNOSIS — L89.313 STAGE III PRESSURE ULCER OF RIGHT BUTTOCK: ICD-10-CM

## 2023-06-09 DIAGNOSIS — L89.512 STAGE II PRESSURE ULCER OF RIGHT ANKLE: ICD-10-CM

## 2023-06-09 DIAGNOSIS — L89.324 LEFT ISCHIAL PRESSURE SORE, STAGE IV: ICD-10-CM

## 2023-06-09 DIAGNOSIS — L89.623 STAGE III PRESSURE ULCER OF LEFT HEEL: ICD-10-CM

## 2023-06-09 DIAGNOSIS — Q05.2 SPINA BIFIDA WITH HYDROCEPHALUS, LUMBAR REGION: Primary | ICD-10-CM

## 2023-06-09 PROCEDURE — 11042 DBRDMT SUBQ TIS 1ST 20SQCM/<: CPT

## 2023-06-09 PROCEDURE — 17250 CHEM CAUT OF GRANLTJ TISSUE: CPT | Mod: 59

## 2023-06-09 NOTE — PROGRESS NOTES
[facility]  Wound Care  Progress Note    Problem List Items Addressed This Visit          Neuro    Spina bifida with hydrocephalus, lumbar region - Primary       Orthopedic    Stage III pressure ulcer of left heel    Left ischial pressure sore, stage IV    Overview     HPI: 38 yr old female with history of spina bifida, does not walk, was seen in the wound clinic years ago but recently hospitalized in [place] for multiple pressure ulcers, she presents today for wound care.    Location: left plantar heel extending to posterior ankle, right plantar mid foot, sacrum , left ischium, left buttocks, right buttocks, right dorsal ankle    Duration: 3-27-23, left and right buttocks: 5-8-23, right dorsal ankle: 5-24-23    Context: pressure    Associated Signs & Symptoms: denies pain    Timing: n/a    Severity: n/a    Quality: n/a    Modifying Factors: n/a    5-12-23; here for wound care for multiple pressure ulcers. She has several new ones to her buttocks.  5-19-23: here for wound care for multiple pressure ulcers   6-2-23: here for wound care for multiple pressure ulcers.  6-9-23: here for wound care for multiple pressure ulcers         Stage III pressure ulcer of left buttock    Stage III pressure ulcer of right buttock       See wound doc progress notes. Documents will be scanned.        Hyacinth Aguero  [facility]  Progress Note    Problem List Items Addressed This Visit          Neuro    Spina bifida with hydrocephalus, lumbar region - Primary       Orthopedic    Stage III pressure ulcer of left heel    Left ischial pressure sore, stage IV    Overview     HPI: 38 yr old female with history of spina bifida, does not walk, was seen in the wound clinic years ago but recently hospitalized in [place] for multiple pressure ulcers, she presents today for wound care.    Location: left plantar heel extending to posterior ankle, right plantar mid  foot, sacrum , left ischium, left buttocks, right buttocks, right dorsal ankle    Duration: 3-27-23, left and right buttocks: 5-8-23, right dorsal ankle: 5-24-23    Context: pressure    Associated Signs & Symptoms: denies pain    Timing: n/a    Severity: n/a    Quality: n/a    Modifying Factors: n/a    5-12-23; here for wound care for multiple pressure ulcers. She has several new ones to her buttocks.  5-19-23: here for wound care for multiple pressure ulcers   6-2-23: here for wound care for multiple pressure ulcers.  6-9-23: here for wound care for multiple pressure ulcers         Stage III pressure ulcer of left buttock    Stage III pressure ulcer of right buttock     Physical Exam  Vitals reviewed.   HENT:      Head: Normocephalic.   Pulmonary:      Effort: Pulmonary effort is normal.   Musculoskeletal:      Comments: In a wheelchair d/t spina bifida   Skin:     General: Skin is warm and dry.      Comments: Left plantar heel extending to posterior ankle is a stage 3 pressure ulcer with adipose exposed,   Stage 3 pressure ulcer to left ischium with adipose exposed.  Stage 3 pressure ulcers to left buttocks with adipose exposed. Right buttocks is healed. Stage 2 pressure ulcer to right dorsal ankle that is superficial   Neurological:      Mental Status: She is alert and oriented to person, place, and time. Mental status is at baseline.   Psychiatric:         Mood and Affect: Mood normal.         Behavior: Behavior normal.         Thought Content: Thought content normal.         Judgment: Judgment normal.   All ulcers except right dorsal ankle debrided, all are doing very well, continue silver alginate qod to all, keep clean and dry and offload pressure, pt still does not have a wheelchair cushion in place, she has one at home but does not like that it raises her up, encouraged to use the cushion for pressure reduction, will recheck in two weeks, her potential to heal is good.  See wound doc progress notes.  Documents will be scanned.        Hyacinth CastleBoeuf  [facility]

## 2023-06-16 ENCOUNTER — OFFICE VISIT (OUTPATIENT)
Dept: WOUND CARE | Facility: HOSPITAL | Age: 38
End: 2023-06-16
Attending: NURSE PRACTITIONER
Payer: MEDICAID

## 2023-06-16 VITALS
SYSTOLIC BLOOD PRESSURE: 121 MMHG | TEMPERATURE: 98 F | HEART RATE: 71 BPM | DIASTOLIC BLOOD PRESSURE: 73 MMHG | RESPIRATION RATE: 17 BRPM

## 2023-06-16 DIAGNOSIS — L89.623 STAGE III PRESSURE ULCER OF LEFT HEEL: ICD-10-CM

## 2023-06-16 DIAGNOSIS — Q05.2 SPINA BIFIDA WITH HYDROCEPHALUS, LUMBAR REGION: Primary | ICD-10-CM

## 2023-06-16 DIAGNOSIS — L89.323 STAGE III PRESSURE ULCER OF LEFT BUTTOCK: ICD-10-CM

## 2023-06-16 DIAGNOSIS — L89.324 LEFT ISCHIAL PRESSURE SORE, STAGE IV: ICD-10-CM

## 2023-06-16 PROCEDURE — 11042 DBRDMT SUBQ TIS 1ST 20SQCM/<: CPT

## 2023-06-16 NOTE — PROGRESS NOTES
[facility]  Wound Care  Progress Note    Problem List Items Addressed This Visit          Neuro    Spina bifida with hydrocephalus, lumbar region - Primary       Orthopedic    Stage III pressure ulcer of left heel    Left ischial pressure sore, stage IV    Overview     HPI: 38 yr old female with history of spina bifida, does not walk, was seen in the wound clinic years ago but recently hospitalized in [place] for multiple pressure ulcers, she presents today for wound care.    Location: left plantar heel extending to posterior ankle, right plantar mid foot, sacrum , left ischium, left buttocks, right buttocks, right dorsal ankle    Duration: 3-27-23, left and right buttocks: 5-8-23, right dorsal ankle: 5-24-23    Context: pressure    Associated Signs & Symptoms: denies pain    Timing: n/a    Severity: n/a    Quality: n/a    Modifying Factors: n/a    5-12-23; here for wound care for multiple pressure ulcers. She has several new ones to her buttocks.  5-19-23: here for wound care for multiple pressure ulcers   6-2-23: here for wound care for multiple pressure ulcers.  6-9-23: here for wound care for multiple pressure ulcers  6-15-23: here for wound care for multiple pressure ulcers         Stage III pressure ulcer of left buttock   Physical Exam  Vitals reviewed.   Constitutional:       Appearance: Normal appearance.   HENT:      Head: Normocephalic.   Pulmonary:      Effort: Pulmonary effort is normal.   Musculoskeletal:      Comments: In a wheelchair d/t spina bifida.   Skin:     General: Skin is warm and dry.   Neurological:      Mental Status: She is alert. Mental status is at baseline.   Psychiatric:         Mood and Affect: Mood normal.         Behavior: Behavior normal.         Thought Content: Thought content normal.         Judgment: Judgment normal.     Ulcers debrided, home health had called 2 days ago to report red, warm foot to left foot. Instructed her at that time to go to ER, she  did not go, today it is better, will use topical abx daily, silver alginate qod to others, keep clean and dry, pt is not offloading pressure, she sits in a wheelchair without any cushioning for most of the day, her ischial ulcer is slightly deeper.   See wound doc progress notes. Documents will be scanned.        Hyacinth Aguero  [facility]

## 2023-06-23 ENCOUNTER — OFFICE VISIT (OUTPATIENT)
Dept: WOUND CARE | Facility: HOSPITAL | Age: 38
End: 2023-06-23
Attending: NURSE PRACTITIONER
Payer: MEDICAID

## 2023-06-23 VITALS
RESPIRATION RATE: 18 BRPM | HEART RATE: 75 BPM | TEMPERATURE: 98 F | DIASTOLIC BLOOD PRESSURE: 67 MMHG | SYSTOLIC BLOOD PRESSURE: 115 MMHG

## 2023-06-23 DIAGNOSIS — L89.324 LEFT ISCHIAL PRESSURE SORE, STAGE IV: Primary | ICD-10-CM

## 2023-06-23 DIAGNOSIS — Q05.2 SPINA BIFIDA WITH HYDROCEPHALUS, LUMBAR REGION: ICD-10-CM

## 2023-06-23 DIAGNOSIS — L89.623 STAGE III PRESSURE ULCER OF LEFT HEEL: ICD-10-CM

## 2023-06-23 PROCEDURE — 11042 DBRDMT SUBQ TIS 1ST 20SQCM/<: CPT

## 2023-06-23 NOTE — PROGRESS NOTES
[facility]  Wound Care  Progress Note    Problem List Items Addressed This Visit          Neuro    Spina bifida with hydrocephalus, lumbar region       Orthopedic    Stage III pressure ulcer of left heel    Left ischial pressure sore, stage IV - Primary    Overview     HPI: 38 yr old female with history of spina bifida, does not walk, was seen in the wound clinic years ago but recently hospitalized in [place] for multiple pressure ulcers, she presents today for wound care.    Location: left plantar heel extending to posterior ankle, left ischium,     Duration: 3-27-23    Context: pressure    Associated Signs & Symptoms: denies pain    Timing: n/a    Severity: n/a    Quality: n/a    Modifying Factors: n/a    5-12-23; here for wound care for multiple pressure ulcers. She has several new ones to her buttocks.  5-19-23: here for wound care for multiple pressure ulcers   6-2-23: here for wound care for multiple pressure ulcers.  6-9-23: here for wound care for multiple pressure ulcers  6-15-23: here for wound care for multiple pressure ulcers  6-23-23: here for wound care for pressure ulcers        Physical Exam  Constitutional:       Appearance: Normal appearance.   HENT:      Head: Normocephalic.   Pulmonary:      Effort: Pulmonary effort is normal.   Musculoskeletal:      Comments: Pt in a wheelchair   Skin:     General: Skin is warm and dry.      Comments: Stage 4 pressure ulcer to left ischium with adipose exposed. Stage 3 pressure ulcer to left foot with adipose exposed.   Neurological:      Mental Status: She is alert and oriented to person, place, and time.   Psychiatric:         Mood and Affect: Mood normal.         Behavior: Behavior normal.         Thought Content: Thought content normal.         Judgment: Judgment normal.     Ulcers debrided, both are doing well, others to her buttocks have healed, continue topical abx wet to dry to foot daily and silver alginate qod to ischium qod, keep  clean and dry and will recheck in one week. Her potential to heal is good.  See wound doc progress notes. Documents will be scanned.        Hyacinth CastleBoeuf  [facility]

## 2023-06-30 ENCOUNTER — OFFICE VISIT (OUTPATIENT)
Dept: WOUND CARE | Facility: HOSPITAL | Age: 38
End: 2023-06-30
Attending: NURSE PRACTITIONER
Payer: MEDICAID

## 2023-06-30 VITALS
SYSTOLIC BLOOD PRESSURE: 110 MMHG | HEART RATE: 70 BPM | RESPIRATION RATE: 18 BRPM | TEMPERATURE: 97 F | DIASTOLIC BLOOD PRESSURE: 64 MMHG

## 2023-06-30 DIAGNOSIS — L89.623 STAGE III PRESSURE ULCER OF LEFT HEEL: ICD-10-CM

## 2023-06-30 DIAGNOSIS — Q05.2 SPINA BIFIDA WITH HYDROCEPHALUS, LUMBAR REGION: ICD-10-CM

## 2023-06-30 DIAGNOSIS — L89.324 LEFT ISCHIAL PRESSURE SORE, STAGE IV: Primary | ICD-10-CM

## 2023-06-30 PROCEDURE — 11042 DBRDMT SUBQ TIS 1ST 20SQCM/<: CPT

## 2023-06-30 NOTE — PROGRESS NOTES
[facility]  Wound Care  Progress Note    Problem List Items Addressed This Visit          Neuro    Spina bifida with hydrocephalus, lumbar region       Orthopedic    Stage III pressure ulcer of left heel    Left ischial pressure sore, stage IV - Primary    Overview     HPI: 38 yr old female with history of spina bifida, does not walk, was seen in the wound clinic years ago but recently hospitalized in [place] for multiple pressure ulcers, she presents today for wound care.    Location: left plantar heel extending to posterior ankle, left ischium,     Duration: 3-27-23    Context: pressure    Associated Signs & Symptoms: denies pain    Timing: n/a    Severity: n/a    Quality: n/a    Modifying Factors: n/a    5-12-23; here for wound care for multiple pressure ulcers. She has several new ones to her buttocks.  5-19-23: here for wound care for multiple pressure ulcers   6-2-23: here for wound care for multiple pressure ulcers.  6-9-23: here for wound care for multiple pressure ulcers  6-15-23: here for wound care for multiple pressure ulcers  6-23-23: here for wound care for pressure ulcers  6-30-23: here for wound care for pressure ulcers.        Physical Exam  Vitals reviewed.   Constitutional:       Appearance: Normal appearance.   HENT:      Head: Normocephalic.   Pulmonary:      Effort: Pulmonary effort is normal.   Musculoskeletal:      Comments: Pt in a wheelchair   Skin:     General: Skin is warm and dry.      Comments: Stage 4 pressure ulcer to left ischium with adipose exposed. Stage 3 pressure ulcer to left heel area with adipose exposed.    Neurological:      Mental Status: She is alert and oriented to person, place, and time. Mental status is at baseline.   Psychiatric:         Mood and Affect: Mood normal.         Behavior: Behavior normal.         Thought Content: Thought content normal.         Judgment: Judgment normal.     Ulcers debrided, doing well, will change to santyl daily  to foot, silver alginate to ischium qod, keep clean and dry, pt continue to sit in a wheelchair without a cushion, will recheck in 2 weeks. Her potential to heal is good.  See wound doc progress notes. Documents will be scanned.        Hyacinth Aguero  [facility]

## 2023-07-14 ENCOUNTER — OFFICE VISIT (OUTPATIENT)
Dept: WOUND CARE | Facility: HOSPITAL | Age: 38
End: 2023-07-14
Attending: NURSE PRACTITIONER
Payer: MEDICAID

## 2023-07-14 VITALS
HEART RATE: 78 BPM | RESPIRATION RATE: 18 BRPM | SYSTOLIC BLOOD PRESSURE: 115 MMHG | TEMPERATURE: 98 F | DIASTOLIC BLOOD PRESSURE: 67 MMHG

## 2023-07-14 DIAGNOSIS — L89.623 STAGE III PRESSURE ULCER OF LEFT HEEL: ICD-10-CM

## 2023-07-14 DIAGNOSIS — L89.324 STAGE IV PRESSURE ULCER OF LEFT BUTTOCK: ICD-10-CM

## 2023-07-14 DIAGNOSIS — Q05.2 SPINA BIFIDA WITH HYDROCEPHALUS, LUMBAR REGION: ICD-10-CM

## 2023-07-14 DIAGNOSIS — L89.324 LEFT ISCHIAL PRESSURE SORE, STAGE IV: ICD-10-CM

## 2023-07-14 DIAGNOSIS — L89.213 STAGE III PRESSURE ULCER OF RIGHT HIP: Primary | ICD-10-CM

## 2023-07-14 PROCEDURE — 11042 DBRDMT SUBQ TIS 1ST 20SQCM/<: CPT

## 2023-07-14 NOTE — PROGRESS NOTES
[facility]  Wound Care  Progress Note    Problem List Items Addressed This Visit          Neuro    Spina bifida with hydrocephalus, lumbar region       Orthopedic    Stage III pressure ulcer of left heel    Left ischial pressure sore, stage IV    Overview     HPI: 38 yr old female with history of spina bifida, does not walk, was seen in the wound clinic years ago but recently hospitalized in [place] for multiple pressure ulcers, she presents today for wound care.    Location: left plantar heel extending to posterior ankle, left ischium, right hip    Duration: left plantar heel extending to posterior ankle, left ischium: 3-27-23, right hip; 7-8-23    Context: pressure    Associated Signs & Symptoms: denies pain    Timing: n/a    Severity: n/a    Quality: n/a    Modifying Factors: n/a    5-12-23; here for wound care for multiple pressure ulcers. She has several new ones to her buttocks.  5-19-23: here for wound care for multiple pressure ulcers   6-2-23: here for wound care for multiple pressure ulcers.  6-9-23: here for wound care for multiple pressure ulcers  6-15-23: here for wound care for multiple pressure ulcers  6-23-23: here for wound care for pressure ulcers  6-30-23: here for wound care for pressure ulcers.  7-14-23: here for wound care for pressure ulcers         Stage III pressure ulcer of right hip - Primary     Other Visit Diagnoses       Stage IV pressure ulcer of left buttock            Physical Exam  Vitals reviewed.   Constitutional:       Appearance: Normal appearance.   HENT:      Head: Normocephalic.   Pulmonary:      Effort: Pulmonary effort is normal.   Skin:     General: Skin is warm and dry.      Comments: Stage 3 pressure ulcers to left heel and right hip with adipose exposed, stage 4 pressure ulcer to left ischium with adipose exposed.   Neurological:      Mental Status: She is alert and oriented to person, place, and time.   Psychiatric:         Mood and Affect: Mood  normal.         Behavior: Behavior normal.         Thought Content: Thought content normal.         Judgment: Judgment normal.     Ulcers debrided, will use silver alginate qod to all. Keep clean and dry, offload pressure often. Keep clean and dry, will recheck in one week, her potential to heal is good.  See wound doc progress notes. Documents will be scanned.        Hyacinth Aguero  [facility]

## 2023-07-21 ENCOUNTER — OFFICE VISIT (OUTPATIENT)
Dept: WOUND CARE | Facility: HOSPITAL | Age: 38
End: 2023-07-21
Attending: NURSE PRACTITIONER
Payer: MEDICAID

## 2023-07-21 VITALS
SYSTOLIC BLOOD PRESSURE: 113 MMHG | DIASTOLIC BLOOD PRESSURE: 66 MMHG | RESPIRATION RATE: 17 BRPM | TEMPERATURE: 98 F | HEART RATE: 75 BPM

## 2023-07-21 DIAGNOSIS — L89.324 LEFT ISCHIAL PRESSURE SORE, STAGE IV: ICD-10-CM

## 2023-07-21 DIAGNOSIS — L89.623 STAGE III PRESSURE ULCER OF LEFT HEEL: ICD-10-CM

## 2023-07-21 DIAGNOSIS — Q05.2 SPINA BIFIDA WITH HYDROCEPHALUS, LUMBAR REGION: ICD-10-CM

## 2023-07-21 DIAGNOSIS — L89.324 STAGE IV PRESSURE ULCER OF LEFT BUTTOCK: ICD-10-CM

## 2023-07-21 DIAGNOSIS — L89.213 STAGE III PRESSURE ULCER OF RIGHT HIP: Primary | ICD-10-CM

## 2023-07-21 PROCEDURE — 11042 DBRDMT SUBQ TIS 1ST 20SQCM/<: CPT

## 2023-07-21 NOTE — PROGRESS NOTES
[facility]  Wound Care  Progress Note    Problem List Items Addressed This Visit          Neuro    Spina bifida with hydrocephalus, lumbar region       Orthopedic    Stage III pressure ulcer of left heel    Left ischial pressure sore, stage IV    Overview     HPI: 38 yr old female with history of spina bifida, does not walk, was seen in the wound clinic years ago but recently hospitalized in [place] for multiple pressure ulcers, she presents today for wound care.    Location: left plantar heel extending to posterior ankle, left ischium, right hip    Duration: left plantar heel extending to posterior ankle, left ischium: 3-27-23, right hip; 7-8-23    Context: pressure    Associated Signs & Symptoms: denies pain    Timing: n/a    Severity: n/a    Quality: n/a    Modifying Factors: n/a    5-12-23; here for wound care for multiple pressure ulcers. She has several new ones to her buttocks.  5-19-23: here for wound care for multiple pressure ulcers   6-2-23: here for wound care for multiple pressure ulcers.  6-9-23: here for wound care for multiple pressure ulcers  6-15-23: here for wound care for multiple pressure ulcers  6-23-23: here for wound care for pressure ulcers  6-30-23: here for wound care for pressure ulcers.  7-14-23: here for wound care for pressure ulcers  7-21-23: here for wound care for pressure ulcers         Stage III pressure ulcer of right hip - Primary     Other Visit Diagnoses       Stage IV pressure ulcer of left buttock              Physical Exam  Vitals reviewed.   Constitutional:       Appearance: Normal appearance.   HENT:      Head: Normocephalic.   Pulmonary:      Effort: Pulmonary effort is normal.   Musculoskeletal:      Comments: In a wheelchair   Skin:     General: Skin is warm and dry.      Comments: Stage 3 pressure ulcers to left heel and right hip with adipose exposed, stage 4 pressure ulcer to left ischium with adipose exposed.   Neurological:      Mental  Status: She is alert and oriented to person, place, and time.   Psychiatric:         Mood and Affect: Mood normal.         Behavior: Behavior normal.         Thought Content: Thought content normal.         Judgment: Judgment normal.     Ulcers debrided, doing well. Continue silver alginate qod, calmoseptine to jose wound. Offload pressure and keep clean and dry, will recheck 2 weeks. Her potential to heal is good.  See wound doc progress notes. Documents will be scanned.        Hyacinth Aguero  [facility]

## 2023-08-04 ENCOUNTER — OFFICE VISIT (OUTPATIENT)
Dept: WOUND CARE | Facility: HOSPITAL | Age: 38
End: 2023-08-04
Attending: NURSE PRACTITIONER
Payer: MEDICAID

## 2023-08-04 VITALS
HEART RATE: 76 BPM | TEMPERATURE: 98 F | SYSTOLIC BLOOD PRESSURE: 110 MMHG | RESPIRATION RATE: 17 BRPM | DIASTOLIC BLOOD PRESSURE: 69 MMHG

## 2023-08-04 DIAGNOSIS — L89.324 LEFT ISCHIAL PRESSURE SORE, STAGE IV: ICD-10-CM

## 2023-08-04 DIAGNOSIS — L89.213 STAGE III PRESSURE ULCER OF RIGHT HIP: Primary | ICD-10-CM

## 2023-08-04 DIAGNOSIS — L89.623 STAGE III PRESSURE ULCER OF LEFT HEEL: ICD-10-CM

## 2023-08-04 DIAGNOSIS — L89.324 STAGE IV PRESSURE ULCER OF LEFT BUTTOCK: ICD-10-CM

## 2023-08-04 PROCEDURE — 11042 DBRDMT SUBQ TIS 1ST 20SQCM/<: CPT

## 2023-08-04 NOTE — PROGRESS NOTES
[facility]  Wound Care  Progress Note    Problem List Items Addressed This Visit          Orthopedic    Stage III pressure ulcer of left heel    Left ischial pressure sore, stage IV    Overview     HPI: 38 yr old female with history of spina bifida, does not walk, was seen in the wound clinic years ago but recently hospitalized in [place] for multiple pressure ulcers, she presents today for wound care.    Location: left plantar heel extending to posterior ankle, left ischium, right hip    Duration: left plantar heel extending to posterior ankle, left ischium: 3-27-23, right hip; 7-8-23    Context: pressure    Associated Signs & Symptoms: denies pain    Timing: n/a    Severity: n/a    Quality: n/a    Modifying Factors: n/a    5-12-23; here for wound care for multiple pressure ulcers. She has several new ones to her buttocks.  5-19-23: here for wound care for multiple pressure ulcers   6-2-23: here for wound care for multiple pressure ulcers.  6-9-23: here for wound care for multiple pressure ulcers  6-15-23: here for wound care for multiple pressure ulcers  6-23-23: here for wound care for pressure ulcers  6-30-23: here for wound care for pressure ulcers.  7-14-23: here for wound care for pressure ulcers  7-21-23: here for wound care for pressure ulcers  8-4-23: here for wound care for multiple pressure ulcers.         Stage III pressure ulcer of right hip - Primary     Other Visit Diagnoses       Stage IV pressure ulcer of left buttock            Physical Exam  Vitals reviewed.   Constitutional:       Appearance: Normal appearance.   HENT:      Head: Normocephalic.   Pulmonary:      Effort: Pulmonary effort is normal.   Musculoskeletal:      Comments: Pt in a wheelchair d/t spina bifida   Skin:     General: Skin is warm and dry.      Comments: Stage 4 pressure ulcer to left ischium with adipose exposed, stage 3 pressure ulcer to left heel is resolved today, stage 3 pressure ulcer to right hip  with adipose exposed.   Neurological:      Mental Status: She is alert and oriented to person, place, and time.   Psychiatric:         Mood and Affect: Mood normal.         Behavior: Behavior normal.         Thought Content: Thought content normal.         Judgment: Judgment normal.     Left heel is healed today, ischium and hip debrided, looking better. Will continue silver alginate qod to both, keep clean and dry, offload pressure at all times. Pt did not have a dressing on her ischial ulcer and was full of stool, will recheck in one week.   See wound doc progress notes. Documents will be scanned.        Hyacinth CastleBoeuf  [facility]

## 2023-08-11 ENCOUNTER — OFFICE VISIT (OUTPATIENT)
Dept: WOUND CARE | Facility: HOSPITAL | Age: 38
End: 2023-08-11
Attending: NURSE PRACTITIONER
Payer: MEDICAID

## 2023-08-11 VITALS
TEMPERATURE: 98 F | SYSTOLIC BLOOD PRESSURE: 108 MMHG | RESPIRATION RATE: 18 BRPM | DIASTOLIC BLOOD PRESSURE: 70 MMHG | HEART RATE: 77 BPM

## 2023-08-11 DIAGNOSIS — L89.324 STAGE IV PRESSURE ULCER OF LEFT BUTTOCK: ICD-10-CM

## 2023-08-11 DIAGNOSIS — Q05.2 SPINA BIFIDA WITH HYDROCEPHALUS, LUMBAR REGION: ICD-10-CM

## 2023-08-11 DIAGNOSIS — L89.324 LEFT ISCHIAL PRESSURE SORE, STAGE IV: ICD-10-CM

## 2023-08-11 DIAGNOSIS — L89.213 STAGE III PRESSURE ULCER OF RIGHT HIP: Primary | ICD-10-CM

## 2023-08-11 PROCEDURE — 11042 DBRDMT SUBQ TIS 1ST 20SQCM/<: CPT

## 2023-08-11 NOTE — PROGRESS NOTES
[facility]  Wound Care  Progress Note    Problem List Items Addressed This Visit          Neuro    Spina bifida with hydrocephalus, lumbar region       Orthopedic    Left ischial pressure sore, stage IV    Overview     HPI: 38 yr old female with history of spina bifida, does not walk, was seen in the wound clinic years ago but recently hospitalized in [place] for multiple pressure ulcers, she presents today for wound care.    Location: left plantar heel extending to posterior ankle, left ischium, right hip    Duration:  left ischium: 3-27-23, right hip; 7-8-23    Context: pressure    Associated Signs & Symptoms: denies pain    Timing: n/a    Severity: n/a    Quality: n/a    Modifying Factors: n/a    5-12-23; here for wound care for multiple pressure ulcers. She has several new ones to her buttocks.  5-19-23: here for wound care for multiple pressure ulcers   6-2-23: here for wound care for multiple pressure ulcers.  6-9-23: here for wound care for multiple pressure ulcers  6-15-23: here for wound care for multiple pressure ulcers  6-23-23: here for wound care for pressure ulcers  6-30-23: here for wound care for pressure ulcers.  7-14-23: here for wound care for pressure ulcers  7-21-23: here for wound care for pressure ulcers  8-4-23: here for wound care for multiple pressure ulcers.  8-11-23: here for wound care for multiple pressure ulcers.         Stage III pressure ulcer of right hip - Primary     Other Visit Diagnoses       Stage IV pressure ulcer of left buttock            Physical Exam  Vitals reviewed.   Constitutional:       Appearance: Normal appearance.   HENT:      Head: Normocephalic.   Pulmonary:      Effort: Pulmonary effort is normal.   Musculoskeletal:      Comments: Pt has spina bifida and is in a wheelchair   Skin:     General: Skin is warm and dry.      Comments: Stage 4 pressure ulcer to left ischium with adipose exposed, stage 3 pressure ulcer to right hip with adipose  exposed.   Neurological:      Mental Status: She is alert and oriented to person, place, and time.   Psychiatric:         Mood and Affect: Mood normal.         Behavior: Behavior normal.         Thought Content: Thought content normal.         Judgment: Judgment normal.     Ulcers debrided, doing well, continue silver alginate qod, keep clean and dry and will recheck in one week, her potential to heal is good.  See wound doc progress notes. Documents will be scanned.        Hyacinth Laci  [facility]

## 2023-08-18 ENCOUNTER — OFFICE VISIT (OUTPATIENT)
Dept: WOUND CARE | Facility: HOSPITAL | Age: 38
End: 2023-08-18
Attending: NURSE PRACTITIONER
Payer: MEDICAID

## 2023-08-18 VITALS
RESPIRATION RATE: 17 BRPM | HEART RATE: 81 BPM | SYSTOLIC BLOOD PRESSURE: 123 MMHG | DIASTOLIC BLOOD PRESSURE: 73 MMHG | TEMPERATURE: 98 F

## 2023-08-18 DIAGNOSIS — L89.324 LEFT ISCHIAL PRESSURE SORE, STAGE IV: ICD-10-CM

## 2023-08-18 DIAGNOSIS — Q05.2 SPINA BIFIDA WITH HYDROCEPHALUS, LUMBAR REGION: Primary | ICD-10-CM

## 2023-08-18 DIAGNOSIS — L89.213 STAGE III PRESSURE ULCER OF RIGHT HIP: ICD-10-CM

## 2023-08-18 PROCEDURE — 11042 DBRDMT SUBQ TIS 1ST 20SQCM/<: CPT

## 2023-08-18 NOTE — PROGRESS NOTES
[facility]  Wound Care  Progress Note    Problem List Items Addressed This Visit          Neuro    Spina bifida with hydrocephalus, lumbar region - Primary       Orthopedic    Left ischial pressure sore, stage IV    Overview     HPI: 38 yr old female with history of spina bifida, does not walk, was seen in the wound clinic years ago but recently hospitalized in [place] for multiple pressure ulcers, she presents today for wound care.    Location:  left ischium, right hip    Duration:  left ischium: 3-27-23, right hip; 7-8-23    Context: pressure    Associated Signs & Symptoms: denies pain    Timing: n/a    Severity: n/a    Quality: n/a    Modifying Factors: n/a    5-12-23; here for wound care for multiple pressure ulcers. She has several new ones to her buttocks.  5-19-23: here for wound care for multiple pressure ulcers   6-2-23: here for wound care for multiple pressure ulcers.  6-9-23: here for wound care for multiple pressure ulcers  6-15-23: here for wound care for multiple pressure ulcers  6-23-23: here for wound care for pressure ulcers  6-30-23: here for wound care for pressure ulcers.  7-14-23: here for wound care for pressure ulcers  7-21-23: here for wound care for pressure ulcers  8-4-23: here for wound care for multiple pressure ulcers.  8-11-23: here for wound care for multiple pressure ulcers.  8-18-23: here for wound care for multiple pressure ulcers         Stage III pressure ulcer of right hip     Physical Exam  Constitutional:       Appearance: Normal appearance.   HENT:      Head: Normocephalic.   Pulmonary:      Effort: Pulmonary effort is normal.   Musculoskeletal:      Comments: In a wheelchair d/t spina bifida   Skin:     General: Skin is warm and dry.      Comments: Stage 4 pressure ulcer to left ischium with adipose exposed. Stage 3 pressure ulcer to right hip with adipose exposed.   Neurological:      Mental Status: She is alert and oriented to person, place, and time.    Psychiatric:         Mood and Affect: Mood normal.         Behavior: Behavior normal.         Thought Content: Thought content normal.         Judgment: Judgment normal.     Ulcers debrided, pt is not feeling well today, states she feels like she has the flu, body aches, denies fever. Will continue silver alginate qod, keep clean and dry at all times, will recheck in 2 weeks, pt is unable to come to clinic next week.  See wound doc progress notes. Documents will be scanned.        Hyacinth Laci  [facility]

## 2023-09-01 ENCOUNTER — OFFICE VISIT (OUTPATIENT)
Dept: WOUND CARE | Facility: HOSPITAL | Age: 38
End: 2023-09-01
Attending: NURSE PRACTITIONER
Payer: MEDICAID

## 2023-09-01 VITALS
RESPIRATION RATE: 18 BRPM | SYSTOLIC BLOOD PRESSURE: 124 MMHG | TEMPERATURE: 98 F | DIASTOLIC BLOOD PRESSURE: 71 MMHG | HEART RATE: 76 BPM

## 2023-09-01 DIAGNOSIS — L89.324 LEFT ISCHIAL PRESSURE SORE, STAGE IV: ICD-10-CM

## 2023-09-01 DIAGNOSIS — L89.213 STAGE III PRESSURE ULCER OF RIGHT HIP: Primary | ICD-10-CM

## 2023-09-01 DIAGNOSIS — L89.523 STAGE III PRESSURE ULCER OF LEFT ANKLE: ICD-10-CM

## 2023-09-01 PROCEDURE — 11042 DBRDMT SUBQ TIS 1ST 20SQCM/<: CPT

## 2023-09-01 NOTE — PROGRESS NOTES
[facility]  Wound Care  Progress Note    Problem List Items Addressed This Visit          Orthopedic    Left ischial pressure sore, stage IV    Overview     HPI: 38 yr old female with history of spina bifida, does not walk, was seen in the wound clinic years ago but recently hospitalized in [place] for multiple pressure ulcers, she presents today for wound care.    Location:  left ischium, right hip, left posterior ankle    Duration:  left ischium: 3-27-23, right hip; 7-8-23, left posterior ankle: 8-14-23    Context: pressure    Associated Signs & Symptoms: denies pain    Timing: n/a    Severity: n/a    Quality: n/a    Modifying Factors: n/a    5-12-23; here for wound care for multiple pressure ulcers. She has several new ones to her buttocks.  5-19-23: here for wound care for multiple pressure ulcers   6-2-23: here for wound care for multiple pressure ulcers.  6-9-23: here for wound care for multiple pressure ulcers  6-15-23: here for wound care for multiple pressure ulcers  6-23-23: here for wound care for pressure ulcers  6-30-23: here for wound care for pressure ulcers.  7-14-23: here for wound care for pressure ulcers  7-21-23: here for wound care for pressure ulcers  8-4-23: here for wound care for multiple pressure ulcers.  8-11-23: here for wound care for multiple pressure ulcers.  8-18-23: here for wound care for multiple pressure ulcers  9-1-23: here for wound care for multiple pressure ulcers, new one to left posterior ankle         Stage III pressure ulcer of right hip - Primary    Stage III pressure ulcer of left ankle   Physical Exam  Vitals reviewed.   HENT:      Head: Normocephalic.   Pulmonary:      Effort: Pulmonary effort is normal.   Musculoskeletal:      Comments: In a wheelchair d/t spina bifida   Skin:     General: Skin is warm and dry.      Comments: Stage 4 pressure ulcer to left ischium with adipose exposed, stage 3 to right hip with adipose exposed, stage 3 pressure  ulcer to left posterior ankle with adipose exposed.    Neurological:      Mental Status: She is alert and oriented to person, place, and time.   Psychiatric:         Mood and Affect: Mood normal.         Behavior: Behavior normal.         Thought Content: Thought content normal.         Judgment: Judgment normal.     Ulcers debrided, pt states her wheelchair caused the new ulcer. Will use triad cream and cover with xeroform and silver alginate qod, keep clean and dry and offload pressure at all times, silver alginate qod to ischium, will recheck in one week, her potential to heal is good.  See wound doc progress notes. Documents will be scanned.        Hyacnith CastleBoeuf  [facility]

## 2023-09-08 ENCOUNTER — OFFICE VISIT (OUTPATIENT)
Dept: WOUND CARE | Facility: HOSPITAL | Age: 38
End: 2023-09-08
Attending: NURSE PRACTITIONER
Payer: MEDICAID

## 2023-09-08 VITALS
RESPIRATION RATE: 17 BRPM | HEART RATE: 78 BPM | TEMPERATURE: 98 F | SYSTOLIC BLOOD PRESSURE: 121 MMHG | DIASTOLIC BLOOD PRESSURE: 74 MMHG

## 2023-09-08 DIAGNOSIS — Q05.2 SPINA BIFIDA WITH HYDROCEPHALUS, LUMBAR REGION: ICD-10-CM

## 2023-09-08 DIAGNOSIS — L89.324 LEFT ISCHIAL PRESSURE SORE, STAGE IV: ICD-10-CM

## 2023-09-08 DIAGNOSIS — L89.313 STAGE III PRESSURE ULCER OF RIGHT BUTTOCK: ICD-10-CM

## 2023-09-08 DIAGNOSIS — L89.523 STAGE III PRESSURE ULCER OF LEFT ANKLE: Primary | ICD-10-CM

## 2023-09-08 PROCEDURE — 11042 DBRDMT SUBQ TIS 1ST 20SQCM/<: CPT

## 2023-09-08 NOTE — PROGRESS NOTES
[facility]  Wound Care  Progress Note    Problem List Items Addressed This Visit          Neuro    Spina bifida with hydrocephalus, lumbar region       Orthopedic    Left ischial pressure sore, stage IV    Overview     HPI: 38 yr old female with history of spina bifida, does not walk, was seen in the wound clinic years ago but recently hospitalized in [place] for multiple pressure ulcers, she presents today for wound care.    Location:  left ischium, right hip, left posterior ankle    Duration:  left ischium: 3-27-23, right hip; 7-8-23, left posterior ankle: 8-14-23    Context: pressure    Associated Signs & Symptoms: denies pain    Timing: n/a    Severity: n/a    Quality: n/a    Modifying Factors: n/a    5-12-23; here for wound care for multiple pressure ulcers. She has several new ones to her buttocks.  5-19-23: here for wound care for multiple pressure ulcers   6-2-23: here for wound care for multiple pressure ulcers.  6-9-23: here for wound care for multiple pressure ulcers  6-15-23: here for wound care for multiple pressure ulcers  6-23-23: here for wound care for pressure ulcers  6-30-23: here for wound care for pressure ulcers.  7-14-23: here for wound care for pressure ulcers  7-21-23: here for wound care for pressure ulcers  8-4-23: here for wound care for multiple pressure ulcers.  8-11-23: here for wound care for multiple pressure ulcers.  8-18-23: here for wound care for multiple pressure ulcers  9-1-23: here for wound care for multiple pressure ulcers, new one to left posterior ankle  9-8-23: here for wound care for multiple pressure ulcers         Stage III pressure ulcer of right buttock    Stage III pressure ulcer of left ankle - Primary     Physical Exam  Vitals reviewed.   Constitutional:       Appearance: Normal appearance.   HENT:      Head: Normocephalic.   Pulmonary:      Effort: Pulmonary effort is normal.   Skin:     General: Skin is warm and dry.      Comments: Stage 4  pressure ulcer to left ischium  with adipose exposed, stage 3 pressure ulcers to right hip and left posterior ankle, all with adipose exposed.    Neurological:      Mental Status: She is alert and oriented to person, place, and time.   Psychiatric:         Mood and Affect: Mood normal.         Behavior: Behavior normal.         Thought Content: Thought content normal.         Judgment: Judgment normal.     Ulcers debrided, pt will be getting a new wheelchair in a few months, continue silver alginate qod to all, keep clean and dry and will recheck in one week, her potential to heal is fair.  See wound doc progress notes. Documents will be scanned.        Hyacinth CastleBoeuf  [facility]

## 2023-09-15 ENCOUNTER — OFFICE VISIT (OUTPATIENT)
Dept: WOUND CARE | Facility: HOSPITAL | Age: 38
End: 2023-09-15
Attending: NURSE PRACTITIONER
Payer: MEDICAID

## 2023-09-15 VITALS
HEART RATE: 70 BPM | SYSTOLIC BLOOD PRESSURE: 119 MMHG | TEMPERATURE: 98 F | RESPIRATION RATE: 18 BRPM | DIASTOLIC BLOOD PRESSURE: 72 MMHG

## 2023-09-15 DIAGNOSIS — L89.523 STAGE III PRESSURE ULCER OF LEFT ANKLE: ICD-10-CM

## 2023-09-15 DIAGNOSIS — L89.324 LEFT ISCHIAL PRESSURE SORE, STAGE IV: Primary | ICD-10-CM

## 2023-09-15 DIAGNOSIS — L89.213 STAGE III PRESSURE ULCER OF RIGHT HIP: ICD-10-CM

## 2023-09-15 PROCEDURE — 11042 DBRDMT SUBQ TIS 1ST 20SQCM/<: CPT

## 2023-09-15 NOTE — PROGRESS NOTES
[facility]  Wound Care  Progress Note    Problem List Items Addressed This Visit          Orthopedic    Left ischial pressure sore, stage IV - Primary    Overview     HPI: 38 yr old female with history of spina bifida, does not walk, was seen in the wound clinic years ago but recently hospitalized in [place] for multiple pressure ulcers, she presents today for wound care.    Location:  left ischium, right hip, left posterior ankle    Duration:  left ischium: 3-27-23, right hip; 7-8-23, left posterior ankle: 8-14-23    Context: pressure    Associated Signs & Symptoms: denies pain    Timing: n/a    Severity: n/a    Quality: n/a    Modifying Factors: n/a    5-12-23; here for wound care for multiple pressure ulcers. She has several new ones to her buttocks.  5-19-23: here for wound care for multiple pressure ulcers   6-2-23: here for wound care for multiple pressure ulcers.  6-9-23: here for wound care for multiple pressure ulcers  6-15-23: here for wound care for multiple pressure ulcers  6-23-23: here for wound care for pressure ulcers  6-30-23: here for wound care for pressure ulcers.  7-14-23: here for wound care for pressure ulcers  7-21-23: here for wound care for pressure ulcers  8-4-23: here for wound care for multiple pressure ulcers.  8-11-23: here for wound care for multiple pressure ulcers.  8-18-23: here for wound care for multiple pressure ulcers  9-1-23: here for wound care for multiple pressure ulcers, new one to left posterior ankle  9-8-23: here for wound care for multiple pressure ulcers  9-15-23: here for wound care for pressure ulcers         Stage III pressure ulcer of right hip    Stage III pressure ulcer of left ankle   .phy    See wound doc progress notes. Documents will be scanned.        Hyacinth Aguero  [facility]  Progress Note    Problem List Items Addressed This Visit          Orthopedic    Left ischial  pressure sore, stage IV - Primary    Overview     HPI: 38 yr old female with history of spina bifida, does not walk, was seen in the wound clinic years ago but recently hospitalized in [place] for multiple pressure ulcers, she presents today for wound care.    Location:  left ischium, right hip, left posterior ankle    Duration:  left ischium: 3-27-23, right hip; 7-8-23, left posterior ankle: 8-14-23    Context: pressure    Associated Signs & Symptoms: denies pain    Timing: n/a    Severity: n/a    Quality: n/a    Modifying Factors: n/a    5-12-23; here for wound care for multiple pressure ulcers. She has several new ones to her buttocks.  5-19-23: here for wound care for multiple pressure ulcers   6-2-23: here for wound care for multiple pressure ulcers.  6-9-23: here for wound care for multiple pressure ulcers  6-15-23: here for wound care for multiple pressure ulcers  6-23-23: here for wound care for pressure ulcers  6-30-23: here for wound care for pressure ulcers.  7-14-23: here for wound care for pressure ulcers  7-21-23: here for wound care for pressure ulcers  8-4-23: here for wound care for multiple pressure ulcers.  8-11-23: here for wound care for multiple pressure ulcers.  8-18-23: here for wound care for multiple pressure ulcers  9-1-23: here for wound care for multiple pressure ulcers, new one to left posterior ankle  9-8-23: here for wound care for multiple pressure ulcers  9-15-23: here for wound care for pressure ulcers         Stage III pressure ulcer of right hip    Stage III pressure ulcer of left ankle     Physical Exam  Vitals reviewed.   Constitutional:       Appearance: Normal appearance.   HENT:      Head: Normocephalic.   Pulmonary:      Effort: Pulmonary effort is normal.   Musculoskeletal:      Comments: Pt in a wheelchair d/t spina bifida   Skin:     General: Skin is warm and dry.      Comments:  Stage 4 pressure ulcer to left ischium  with adipose exposed, stage 3 pressure ulcers to  right hip and left posterior ankle, all with adipose exposed   Neurological:      Mental Status: She is alert. Mental status is at baseline.   Psychiatric:         Mood and Affect: Mood normal.         Behavior: Behavior normal.         Thought Content: Thought content normal.         Judgment: Judgment normal.     Ulcers debrided, doing well, continue silver alginate qod, keep clean and dry and will recheck in one week,   See wound doc progress notes. Documents will be scanned.        Hyacinth LeBoeuf [facility]

## 2023-09-22 ENCOUNTER — OFFICE VISIT (OUTPATIENT)
Dept: WOUND CARE | Facility: HOSPITAL | Age: 38
End: 2023-09-22
Attending: NURSE PRACTITIONER
Payer: MEDICAID

## 2023-09-22 VITALS
SYSTOLIC BLOOD PRESSURE: 115 MMHG | DIASTOLIC BLOOD PRESSURE: 74 MMHG | RESPIRATION RATE: 17 BRPM | TEMPERATURE: 98 F | HEART RATE: 73 BPM

## 2023-09-22 DIAGNOSIS — L89.213 STAGE III PRESSURE ULCER OF RIGHT HIP: ICD-10-CM

## 2023-09-22 DIAGNOSIS — L89.324 LEFT ISCHIAL PRESSURE SORE, STAGE IV: Primary | ICD-10-CM

## 2023-09-22 DIAGNOSIS — Q05.2 SPINA BIFIDA WITH HYDROCEPHALUS, LUMBAR REGION: ICD-10-CM

## 2023-09-22 DIAGNOSIS — L89.523 STAGE III PRESSURE ULCER OF LEFT ANKLE: ICD-10-CM

## 2023-09-22 PROCEDURE — 11042 DBRDMT SUBQ TIS 1ST 20SQCM/<: CPT

## 2023-09-22 NOTE — PROGRESS NOTES
[facility]  Wound Care  Progress Note    Problem List Items Addressed This Visit          Neuro    Spina bifida with hydrocephalus, lumbar region       Orthopedic    Left ischial pressure sore, stage IV - Primary    Overview     HPI: 38 yr old female with history of spina bifida, does not walk, was seen in the wound clinic years ago but recently hospitalized in [place] for multiple pressure ulcers, she presents today for wound care.    Location:  left ischium, right hip, left posterior ankle    Duration:  left ischium: 3-27-23, right hip; 7-8-23, left posterior ankle: 8-14-23    Context: pressure    Associated Signs & Symptoms: denies pain    Timing: n/a    Severity: n/a    Quality: n/a    Modifying Factors: n/a    5-12-23; here for wound care for multiple pressure ulcers. She has several new ones to her buttocks.  5-19-23: here for wound care for multiple pressure ulcers   6-2-23: here for wound care for multiple pressure ulcers.  6-9-23: here for wound care for multiple pressure ulcers  6-15-23: here for wound care for multiple pressure ulcers  6-23-23: here for wound care for pressure ulcers  6-30-23: here for wound care for pressure ulcers.  7-14-23: here for wound care for pressure ulcers  7-21-23: here for wound care for pressure ulcers  8-4-23: here for wound care for multiple pressure ulcers.  8-11-23: here for wound care for multiple pressure ulcers.  8-18-23: here for wound care for multiple pressure ulcers  9-1-23: here for wound care for multiple pressure ulcers, new one to left posterior ankle  9-8-23: here for wound care for multiple pressure ulcers  9-15-23: here for wound care for pressure ulcers  9-22-23: Here for wound care for pressure ulcers         Stage III pressure ulcer of right hip    Stage III pressure ulcer of left ankle     Physical Exam  Vitals reviewed.   Constitutional:       Appearance: Normal appearance.   HENT:      Head: Normocephalic.   Pulmonary:       Effort: Pulmonary effort is normal.   Musculoskeletal:      Comments: Pt in wheelchair d/t spina bifida   Skin:     General: Skin is warm and dry.      Comments: Stage 4 pressure ulcer to left ischium  with adipose exposed, stage 3 pressure ulcers to right hip and left posterior ankle, all with adipose exposed    Neurological:      Mental Status: She is alert. Mental status is at baseline.   Psychiatric:         Mood and Affect: Mood normal.         Behavior: Behavior normal.         Thought Content: Thought content normal.         Judgment: Judgment normal.     Ulcers debrided, doing well. It will be 3 months until pt gets new WC, still sitting on old WC without a cushion, will continue silver alginate qod to all, offload pressure and keep clean and dry, will recheck in 2 weeks, pt is unable to come next week, her potential to heal is good.  See wound doc progress notes. Documents will be scanned.        Hyacinth CastleBoeuf  [facility]

## 2023-10-06 ENCOUNTER — OFFICE VISIT (OUTPATIENT)
Dept: WOUND CARE | Facility: HOSPITAL | Age: 38
End: 2023-10-06
Attending: NURSE PRACTITIONER
Payer: MEDICAID

## 2023-10-06 ENCOUNTER — HOSPITAL ENCOUNTER (OUTPATIENT)
Dept: RADIOLOGY | Facility: HOSPITAL | Age: 38
Discharge: HOME | End: 2023-10-06
Attending: NURSE PRACTITIONER
Payer: MEDICAID

## 2023-10-06 VITALS
DIASTOLIC BLOOD PRESSURE: 76 MMHG | SYSTOLIC BLOOD PRESSURE: 119 MMHG | RESPIRATION RATE: 18 BRPM | HEART RATE: 85 BPM | TEMPERATURE: 99 F

## 2023-10-06 DIAGNOSIS — L89.323 STAGE III PRESSURE ULCER OF LEFT BUTTOCK: ICD-10-CM

## 2023-10-06 DIAGNOSIS — L89.613 STAGE III PRESSURE ULCER OF RIGHT HEEL: ICD-10-CM

## 2023-10-06 DIAGNOSIS — L97.512 ULCER OF TOE OF RIGHT FOOT, WITH FAT LAYER EXPOSED: ICD-10-CM

## 2023-10-06 DIAGNOSIS — L89.613 STAGE III PRESSURE ULCER OF RIGHT HEEL: Primary | ICD-10-CM

## 2023-10-06 DIAGNOSIS — L89.893 PRESSURE INJURY OF LEFT FOOT, STAGE 3: ICD-10-CM

## 2023-10-06 DIAGNOSIS — L89.324 LEFT ISCHIAL PRESSURE SORE, STAGE IV: ICD-10-CM

## 2023-10-06 PROCEDURE — 11042 DBRDMT SUBQ TIS 1ST 20SQCM/<: CPT

## 2023-10-06 PROCEDURE — 99214 OFFICE O/P EST MOD 30 MIN: CPT | Mod: 25

## 2023-10-06 PROCEDURE — 73630 X-RAY EXAM OF FOOT: CPT | Mod: TC,RT

## 2023-10-06 NOTE — PROGRESS NOTES
[facility]  Wound Care  Progress Note    Problem List Items Addressed This Visit          Orthopedic    Stage III pressure ulcer of right heel - Primary    Overview     HPI: 34 yr old female with history of hydrocephalus, in a wheelchair. Developed a stage 3 pressure ulcer to right heel due to lack of sensation    Location: right heel     Duration: 9-14-18     Context: heel is  Pressure     Associated Signs & Symptoms: denies pain    Timing: n/a    Severity: n/a    Quality: n/a    Modifying Factors: n/a    10-26-18: Here for wound care to right medial heel and right 4th toe wound  11-9-18: here for wound care to right medial heel and right 4th toe wounds  11-16-18: here for wound care for right medial heel wound  11-30-18: here for wound care for right medial heel pressure ulcer.  12-7-18: here for wound care for right medial heel pressure ulcer.  12-14-18: here for wound care to right medial heel  12-21-18:  Here for wound care to right medial heel.  12-28-18: here for wound care to right medial heel pressure ulcer  1-4-19: here for wound care to right medial heel pressure ulcer  1-11-19: here for wound care to right medial heel pressure ulcer and a neuropathic ulcer to her right great toe that has been there for over a month.  1-18-19: here for wound care to right medial pressure ulcer and right great toe neuropathic ulcer  1-25-19: here for wound care for right medial heel stage 3 pressure ulcer and right great toe neuropathic ulcer  2-1-19: here for wound care for right medial heel stage 3 pressure ulcer and neuropathic ulcer to right great toe.  2-8-19: here for wound care for right medial heel stage 3 pressure ulcer  2-15-19: here for wound care for right heel pressure ulcer  2-22-19: here for wound care for right heel pressure ulcer  3-1-19: here for wound care for right heel pressure ulcer  3-8-19: here for wound care for right heel pressure ulcer  3-15-19: here for wound care for right  stage 3 pressure ulcer  3-22-19: here for wound care for stage 3 pressure ulcer to her right heel  3-29-19: here for wound care for stage 3 pressure ulcer to right heel  4-5-19: here for wound care for stage 3 pressure ulcer to right heel.  4-26-19: here for wound care for stage 3 pressure ulcer to right heel and a new neuropathic ulcer to right great toe.  5-3-19: here for wound care for stage 3 pressure ulcer to right heel and a  neuropathic ulcer to right great toe.  5-17-19: here for wound care for stage 3 pressure ulcer to right heel, right toe is healed today  5-24-19: here for wound care for stage 3 pressure ulcer to right heel  5-31-19: here for wound care stage 3 pressure ulcer to right heel  6-14-19: here for wound care for stage 3 pressure ulcer to her right heel.  6-21-19: here for wound care for stage 3 pressure ulcer to right heel         Relevant Orders    X-Ray Foot Complete 3 view Right    Ulcer of toe of right foot, with fat layer exposed    Relevant Orders    X-Ray Foot Complete 3 view Right    Left ischial pressure sore, stage IV    Overview     HPI: 38 yr old female with history of spina bifida, does not walk, was seen in the wound clinic years ago but recently hospitalized in [place] for multiple pressure ulcers, she presents today for wound care.    Location:  left ischium, right hip, left posterior ankle, left lateral foot, right medial great toe, right 4th toe, right heel    Duration:  left ischium: 3-27-23, right hip; 7-8-23, left posterior ankle: 8-14-23, left lateral foot: 10-1-23, right medial great toe: 10-1-23, right 4th toe:10-1-23, right heel: 10-1-23      Context: pressure    Associated Signs & Symptoms: denies pain    Timing: n/a    Severity: n/a    Quality: n/a    Modifying Factors: n/a    5-12-23; here for wound care for multiple pressure ulcers. She has several new ones to her buttocks.  5-19-23: here for wound care for multiple pressure ulcers   6-2-23: here for wound care  for multiple pressure ulcers.  6-9-23: here for wound care for multiple pressure ulcers  6-15-23: here for wound care for multiple pressure ulcers  6-23-23: here for wound care for pressure ulcers  6-30-23: here for wound care for pressure ulcers.  7-14-23: here for wound care for pressure ulcers  7-21-23: here for wound care for pressure ulcers  8-4-23: here for wound care for multiple pressure ulcers.  8-11-23: here for wound care for multiple pressure ulcers.  8-18-23: here for wound care for multiple pressure ulcers  9-1-23: here for wound care for multiple pressure ulcers, new one to left posterior ankle  9-8-23: here for wound care for multiple pressure ulcers  9-15-23: here for wound care for pressure ulcers  9-22-23: Here for wound care for pressure ulcers  10-6-23: here for wound care for pressure ulcers, she has 4 new ones         Pressure injury of left foot, stage 3    RESOLVED: Stage III pressure ulcer of left buttock     Physical Exam  Vitals reviewed.   Constitutional:       Appearance: Normal appearance.   HENT:      Head: Normocephalic.   Pulmonary:      Effort: Pulmonary effort is normal.   Musculoskeletal:      Comments: Pt has spina bifida and is in a wheechair   Skin:     General: Skin is warm and dry.      Comments: Stage 4 pressure ulcer to left ischium  with adipose exposed, stage 3 pressure ulcers to right hip and left posterior ankle are healed , stage 3 pressure ulcers to left lateral foot, right medial great toe, right 4th toe and right heel, all with adipose exposed.   Neurological:      Mental Status: She is alert and oriented to person, place, and time.   Psychiatric:         Mood and Affect: Mood normal.         Behavior: Behavior normal.         Thought Content: Thought content normal.         Judgment: Judgment normal.     Ulcers debrided, multiple new ulcers to right foot. Pt has no idea what is causing these, right 4th toe is circumferential, pt states she has had a wound for  awhile but never showed us, will get a xray d/t severity. Will use silver alginate qod after xray to al ulcers, will recheck in one week, encouraged pt to be more aware of trasferring to avoid more problems, her potential to heal is good.   See wound doc progress notes. Documents will be scanned.        Hyacinth Aguero  [facility]

## 2023-10-13 ENCOUNTER — OFFICE VISIT (OUTPATIENT)
Dept: WOUND CARE | Facility: HOSPITAL | Age: 38
End: 2023-10-13
Attending: NURSE PRACTITIONER
Payer: MEDICAID

## 2023-10-13 VITALS
TEMPERATURE: 99 F | SYSTOLIC BLOOD PRESSURE: 115 MMHG | DIASTOLIC BLOOD PRESSURE: 79 MMHG | RESPIRATION RATE: 18 BRPM | HEART RATE: 87 BPM

## 2023-10-13 DIAGNOSIS — M86.179 OTHER ACUTE OSTEOMYELITIS, UNSPECIFIED ANKLE AND FOOT: ICD-10-CM

## 2023-10-13 DIAGNOSIS — L89.893 PRESSURE INJURY OF LEFT FOOT, STAGE 3: ICD-10-CM

## 2023-10-13 DIAGNOSIS — L89.324 LEFT ISCHIAL PRESSURE SORE, STAGE IV: ICD-10-CM

## 2023-10-13 DIAGNOSIS — L97.512 ULCER OF TOE OF RIGHT FOOT, WITH FAT LAYER EXPOSED: Primary | ICD-10-CM

## 2023-10-13 DIAGNOSIS — L89.323 STAGE III PRESSURE ULCER OF LEFT BUTTOCK: ICD-10-CM

## 2023-10-13 DIAGNOSIS — L89.523 STAGE III PRESSURE ULCER OF LEFT ANKLE: ICD-10-CM

## 2023-10-13 DIAGNOSIS — Q05.2 SPINA BIFIDA WITH HYDROCEPHALUS, LUMBAR REGION: ICD-10-CM

## 2023-10-13 PROCEDURE — 87077 CULTURE AEROBIC IDENTIFY: CPT | Performed by: NURSE PRACTITIONER

## 2023-10-13 PROCEDURE — 87070 CULTURE OTHR SPECIMN AEROBIC: CPT | Performed by: NURSE PRACTITIONER

## 2023-10-13 PROCEDURE — 87075 CULTR BACTERIA EXCEPT BLOOD: CPT | Performed by: NURSE PRACTITIONER

## 2023-10-13 PROCEDURE — 87186 SC STD MICRODIL/AGAR DIL: CPT | Performed by: NURSE PRACTITIONER

## 2023-10-13 PROCEDURE — 11042 DBRDMT SUBQ TIS 1ST 20SQCM/<: CPT

## 2023-10-13 NOTE — PROGRESS NOTES
[facility]  Wound Care  Progress Note    Problem List Items Addressed This Visit          Neuro    Spina bifida with hydrocephalus, lumbar region       Orthopedic    Ulcer of toe of right foot, with fat layer exposed - Primary    Relevant Orders    CBC W/ AUTO DIFFERENTIAL (Completed)    Sedimentation rate    C-REACTIVE PROTEIN    COMPREHENSIVE METABOLIC PANEL (Completed)    PREALBUMIN    CULTURE, AEROBIC  (SPECIFY SOURCE)    CULTURE, ANAEROBE    Left ischial pressure sore, stage IV    Overview     HPI: 38 yr old female with history of spina bifida, does not walk, was seen in the wound clinic years ago but recently hospitalized in [place] for multiple pressure ulcers, she presents today for wound care.    Location:  left ischium, right hip, left posterior ankle, left lateral foot, right medial great toe, right 4th toe, right heel    Duration:  left ischium: 3-27-23, right hip; 7-8-23, left posterior ankle: 8-14-23, left lateral foot: 10-1-23, right medial great toe: 10-1-23, right 4th toe:10-1-23, right heel: 10-1-23      Context: pressure    Associated Signs & Symptoms: denies pain    Timing: n/a    Severity: n/a    Quality: n/a    Modifying Factors: n/a    5-12-23; here for wound care for multiple pressure ulcers. She has several new ones to her buttocks.  5-19-23: here for wound care for multiple pressure ulcers   6-2-23: here for wound care for multiple pressure ulcers.  6-9-23: here for wound care for multiple pressure ulcers  6-15-23: here for wound care for multiple pressure ulcers  6-23-23: here for wound care for pressure ulcers  6-30-23: here for wound care for pressure ulcers.  7-14-23: here for wound care for pressure ulcers  7-21-23: here for wound care for pressure ulcers  8-4-23: here for wound care for multiple pressure ulcers.  8-11-23: here for wound care for multiple pressure ulcers.  8-18-23: here for wound care for multiple pressure ulcers  9-1-23: here for wound care for  multiple pressure ulcers, new one to left posterior ankle  9-8-23: here for wound care for multiple pressure ulcers  9-15-23: here for wound care for pressure ulcers  9-22-23: Here for wound care for pressure ulcers  10-6-23: here for wound care for pressure ulcers, she has 4 new ones  10-13-23: here for wound care for pressure ulcers          Stage III pressure ulcer of left ankle    Pressure injury of left foot, stage 3     Other Visit Diagnoses       Stage III pressure ulcer of left buttock              Physical Exam  Vitals reviewed.   Constitutional:       Appearance: Normal appearance.   HENT:      Head: Normocephalic.   Pulmonary:      Effort: Pulmonary effort is normal.   Musculoskeletal:      Comments: Pt in a wheelchair d/t spina bifida.   Skin:     General: Skin is warm and dry.      Comments: Stage 4 pressure ulcer to left ischium with adipose exposed, stage 3 pressure ulcer to  left lateral foot with adipose exposed, stage 3 pressure ulcer to right medial great toe with adipose exposed, stage 3 pressure ulcer to right 4th toe with adipose exposed,  right heel is resolved today     Neurological:      Mental Status: She is alert and oriented to person, place, and time.   Psychiatric:         Mood and Affect: Mood normal.         Behavior: Behavior normal.         Thought Content: Thought content normal.         Judgment: Judgment normal.     Ulcers debrided, xray of right 4th toe was concerning for osteo, will send for labs to assess inflammatory markers. Will continue silver alginate qod, keep clean and dry, will recheck in 2 weeks. Her potential to heal is good.  See wound doc progress notes. Documents will be scanned.        Hyacinth CastleBoeuf [facility]

## 2023-10-17 LAB
BACTERIA SPEC AEROBE CULT: ABNORMAL
BACTERIA SPEC AEROBE CULT: ABNORMAL

## 2023-10-18 LAB — BACTERIA SPEC ANAEROBE CULT: NORMAL

## 2023-10-19 NOTE — PROGRESS NOTES
[facility]  Wound Care  Progress Note    Problem List Items Addressed This Visit          Neuro    Spina bifida with hydrocephalus, lumbar region       Orthopedic    Ulcer of toe of right foot, with fat layer exposed - Primary    Relevant Orders    CBC W/ AUTO DIFFERENTIAL (Completed)    Sedimentation rate (Completed)    C-REACTIVE PROTEIN (Completed)    COMPREHENSIVE METABOLIC PANEL (Completed)    PREALBUMIN (Completed)    CULTURE, AEROBIC  (SPECIFY SOURCE) (Completed)    CULTURE, ANAEROBE (Completed)    MRI Foot (Forefoot) Right W W/O Contrast    Left ischial pressure sore, stage IV    Overview     HPI: 38 yr old female with history of spina bifida, does not walk, was seen in the wound clinic years ago but recently hospitalized in [place] for multiple pressure ulcers, she presents today for wound care.    Location:  left ischium, right hip, left posterior ankle, left lateral foot, right medial great toe, right 4th toe, right heel    Duration:  left ischium: 3-27-23, right hip; 7-8-23, left posterior ankle: 8-14-23, left lateral foot: 10-1-23, right medial great toe: 10-1-23, right 4th toe:10-1-23, right heel: 10-1-23      Context: pressure    Associated Signs & Symptoms: denies pain    Timing: n/a    Severity: n/a    Quality: n/a    Modifying Factors: n/a    5-12-23; here for wound care for multiple pressure ulcers. She has several new ones to her buttocks.  5-19-23: here for wound care for multiple pressure ulcers   6-2-23: here for wound care for multiple pressure ulcers.  6-9-23: here for wound care for multiple pressure ulcers  6-15-23: here for wound care for multiple pressure ulcers  6-23-23: here for wound care for pressure ulcers  6-30-23: here for wound care for pressure ulcers.  7-14-23: here for wound care for pressure ulcers  7-21-23: here for wound care for pressure ulcers  8-4-23: here for wound care for multiple pressure ulcers.  8-11-23: here for wound care for multiple  pressure ulcers.  8-18-23: here for wound care for multiple pressure ulcers  9-1-23: here for wound care for multiple pressure ulcers, new one to left posterior ankle  9-8-23: here for wound care for multiple pressure ulcers  9-15-23: here for wound care for pressure ulcers  9-22-23: Here for wound care for pressure ulcers  10-6-23: here for wound care for pressure ulcers, she has 4 new ones  10-13-23: here for wound care for pressure ulcers          Stage III pressure ulcer of left ankle    Pressure injury of left foot, stage 3     Other Visit Diagnoses       Stage III pressure ulcer of left buttock        Other acute osteomyelitis, unspecified ankle and foot        Relevant Orders    MRI Foot (Forefoot) Right W W/O Contrast          Physical Exam  Vitals reviewed.   Constitutional:       Appearance: Normal appearance.   HENT:      Head: Normocephalic.   Pulmonary:      Effort: Pulmonary effort is normal.   Musculoskeletal:      Comments: Pt in a wheelchair d/t spina bifida.   Skin:     General: Skin is warm and dry.      Comments: Stage 4 pressure ulcer to left ischium with adipose exposed, stage 3 pressure ulcer to  left lateral foot with adipose exposed, stage 3 pressure ulcer to right medial great toe with adipose exposed, stage 3 pressure ulcer to right 4th toe with adipose exposed,  right heel is resolved today     Neurological:      Mental Status: She is alert and oriented to person, place, and time.   Psychiatric:         Mood and Affect: Mood normal.         Behavior: Behavior normal.         Thought Content: Thought content normal.         Judgment: Judgment normal.       Ulcers debrided, xray of right 4th toe was concerning for osteo, will send for labs to assess inflammatory markers. Will continue silver alginate qod, keep clean and dry, will recheck in 2 weeks. Her potential to heal is good.  See wound doc progress notes. Documents will be scanned.        MELISSA CAMPO [facility]  [place]

## 2023-10-23 ENCOUNTER — HOSPITAL ENCOUNTER (OUTPATIENT)
Dept: RADIOLOGY | Facility: HOSPITAL | Age: 38
Discharge: HOME | End: 2023-10-23
Attending: NURSE PRACTITIONER
Payer: MEDICAID

## 2023-10-23 ENCOUNTER — TELEPHONE (OUTPATIENT)
Dept: NEUROSURGERY | Facility: CLINIC | Age: 38
End: 2023-10-23
Payer: MEDICAID

## 2023-10-23 DIAGNOSIS — Z98.2 S/P VP SHUNT: ICD-10-CM

## 2023-10-23 DIAGNOSIS — Q05.2 SPINA BIFIDA WITH HYDROCEPHALUS, LUMBAR REGION: Primary | ICD-10-CM

## 2023-10-23 NOTE — TELEPHONE ENCOUNTER
Spoke to patient earlier this morning and advised her to have MRI done followed by xray so we can check shunt setting.   She v/u.            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   ----- Message -----  From: Blossom Lee  Sent: 10/23/2023   8:23 AM CDT  To: Naresh Ye Staff  Subject: question                                         Pt called in regards to seeing if she can speak to anyone about her stunt she is having a MRI done this morning and need information about it  .....Please call and adv @ [phone]

## 2023-10-27 ENCOUNTER — OFFICE VISIT (OUTPATIENT)
Dept: WOUND CARE | Facility: HOSPITAL | Age: 38
End: 2023-10-27
Attending: NURSE PRACTITIONER
Payer: MEDICAID

## 2023-10-27 VITALS
DIASTOLIC BLOOD PRESSURE: 76 MMHG | HEART RATE: 84 BPM | SYSTOLIC BLOOD PRESSURE: 117 MMHG | TEMPERATURE: 98 F | RESPIRATION RATE: 17 BRPM

## 2023-10-27 DIAGNOSIS — L97.512 ULCER OF TOE OF RIGHT FOOT, WITH FAT LAYER EXPOSED: Primary | ICD-10-CM

## 2023-10-27 DIAGNOSIS — L89.893 PRESSURE INJURY OF LEFT FOOT, STAGE 3: ICD-10-CM

## 2023-10-27 DIAGNOSIS — L89.213 STAGE III PRESSURE ULCER OF RIGHT HIP: ICD-10-CM

## 2023-10-27 DIAGNOSIS — L89.892 PRESSURE INJURY OF OTHER SITE, STAGE 2: ICD-10-CM

## 2023-10-27 DIAGNOSIS — L89.324 LEFT ISCHIAL PRESSURE SORE, STAGE IV: ICD-10-CM

## 2023-10-27 DIAGNOSIS — L89.613 STAGE III PRESSURE ULCER OF RIGHT HEEL: ICD-10-CM

## 2023-10-27 DIAGNOSIS — Q05.2 SPINA BIFIDA WITH HYDROCEPHALUS, LUMBAR REGION: ICD-10-CM

## 2023-10-27 DIAGNOSIS — L89.893 PRESSURE ULCER OF OTHER SITE, STAGE 3: ICD-10-CM

## 2023-10-27 PROCEDURE — 11042 DBRDMT SUBQ TIS 1ST 20SQCM/<: CPT

## 2023-10-27 NOTE — PROGRESS NOTES
[facility]  Wound Care  Progress Note    Problem List Items Addressed This Visit          Neuro    Spina bifida with hydrocephalus, lumbar region       Orthopedic    Stage III pressure ulcer of right heel    Overview     HPI: 34 yr old female with history of hydrocephalus, in a wheelchair. Developed a stage 3 pressure ulcer to right heel due to lack of sensation    Location: right heel     Duration: 9-14-18     Context: heel is  Pressure     Associated Signs & Symptoms: denies pain    Timing: n/a    Severity: n/a    Quality: n/a    Modifying Factors: n/a    10-26-18: Here for wound care to right medial heel and right 4th toe wound  11-9-18: here for wound care to right medial heel and right 4th toe wounds  11-16-18: here for wound care for right medial heel wound  11-30-18: here for wound care for right medial heel pressure ulcer.  12-7-18: here for wound care for right medial heel pressure ulcer.  12-14-18: here for wound care to right medial heel  12-21-18:  Here for wound care to right medial heel.  12-28-18: here for wound care to right medial heel pressure ulcer  1-4-19: here for wound care to right medial heel pressure ulcer  1-11-19: here for wound care to right medial heel pressure ulcer and a neuropathic ulcer to her right great toe that has been there for over a month.  1-18-19: here for wound care to right medial pressure ulcer and right great toe neuropathic ulcer  1-25-19: here for wound care for right medial heel stage 3 pressure ulcer and right great toe neuropathic ulcer  2-1-19: here for wound care for right medial heel stage 3 pressure ulcer and neuropathic ulcer to right great toe.  2-8-19: here for wound care for right medial heel stage 3 pressure ulcer  2-15-19: here for wound care for right heel pressure ulcer  2-22-19: here for wound care for right heel pressure ulcer  3-1-19: here for wound care for right heel pressure ulcer  3-8-19: here for wound care for right heel  pressure ulcer  3-15-19: here for wound care for right stage 3 pressure ulcer  3-22-19: here for wound care for stage 3 pressure ulcer to her right heel  3-29-19: here for wound care for stage 3 pressure ulcer to right heel  4-5-19: here for wound care for stage 3 pressure ulcer to right heel.  4-26-19: here for wound care for stage 3 pressure ulcer to right heel and a new neuropathic ulcer to right great toe.  5-3-19: here for wound care for stage 3 pressure ulcer to right heel and a  neuropathic ulcer to right great toe.  5-17-19: here for wound care for stage 3 pressure ulcer to right heel, right toe is healed today  5-24-19: here for wound care for stage 3 pressure ulcer to right heel  5-31-19: here for wound care stage 3 pressure ulcer to right heel  6-14-19: here for wound care for stage 3 pressure ulcer to her right heel.  6-21-19: here for wound care for stage 3 pressure ulcer to right heel         Ulcer of toe of right foot, with fat layer exposed - Primary    Left ischial pressure sore, stage IV    Overview     HPI: 38 yr old female with history of spina bifida, does not walk, was seen in the wound clinic years ago but recently hospitalized in [place] for multiple pressure ulcers, she presents today for wound care.    Location:  left ischium, right hip, left posterior ankle, left lateral foot, right medial great toe, right 4th toe, right heel    Duration:  left ischium: 3-27-23, right hip; 7-8-23, left posterior ankle: 8-14-23, left lateral foot: 10-1-23, right medial great toe: 10-1-23, right 4th toe:10-1-23, right heel: 10-1-23      Context: pressure    Associated Signs & Symptoms: denies pain    Timing: n/a    Severity: n/a    Quality: n/a    Modifying Factors: n/a    5-12-23; here for wound care for multiple pressure ulcers. She has several new ones to her buttocks.  5-19-23: here for wound care for multiple pressure ulcers   6-2-23: here for wound care for multiple pressure ulcers.  6-9-23: here for  wound care for multiple pressure ulcers  6-15-23: here for wound care for multiple pressure ulcers  6-23-23: here for wound care for pressure ulcers  6-30-23: here for wound care for pressure ulcers.  7-14-23: here for wound care for pressure ulcers  7-21-23: here for wound care for pressure ulcers  8-4-23: here for wound care for multiple pressure ulcers.  8-11-23: here for wound care for multiple pressure ulcers.  8-18-23: here for wound care for multiple pressure ulcers  9-1-23: here for wound care for multiple pressure ulcers, new one to left posterior ankle  9-8-23: here for wound care for multiple pressure ulcers  9-15-23: here for wound care for pressure ulcers  9-22-23: Here for wound care for pressure ulcers  10-6-23: here for wound care for pressure ulcers, she has 4 new ones  10-13-23: here for wound care for pressure ulcers   10-27-23: here for wound care for pressure ulcers, she has 2 new ones         Stage III pressure ulcer of right hip    Pressure injury of left foot, stage 3    Pressure ulcer of other site, stage 3     Other Visit Diagnoses       Pressure injury of other site, stage 2            Physical Exam  Pulmonary:      Effort: Pulmonary effort is normal.   Abdominal:      Comments: Pt with frequent stool in wound to left ischium   Genitourinary:     Comments: Pt frequently wet d/t incontinence  Musculoskeletal:      Comments: Pt in a wheelchair d/t spina bifida   Skin:     General: Skin is warm and dry.      Comments: Stage 4 pressure ulcer to left ischium with adipose exposed, stage 3 pressure  ulcer to  right hip has re-opened with adipose exposed, stage 3 pressure ulcer to  left posterior ankle is healed today, stage 3 pressure ulcer to  left lateral foot is healed today, pressure ulcer to  right medial great toe is healed today, stage 3 pressure ulcer to right 4th toe with adipose exposed. She has a new stage 3 pressure ulcer to lower mid abdomen with adipose exposed.        Neurological:       Mental Status: She is alert and oriented to person, place, and time.   Psychiatric:         Mood and Affect: Mood normal.         Behavior: Behavior normal.         Thought Content: Thought content normal.         Judgment: Judgment normal.     Ulcers debrided, pt is having MRI next Friday of right foot. She is currently on on bactrim ds po bid for 10 days, finishing tomorrow, will use silver to left ischium, right 4th toe and abdomen qod, santyl daily to right hip, calmoseptine to diapered area to protect skin and hopefully avoid more breakdown. Will recheck in one week, her potential to heal is fair.   See wound doc progress notes. Documents will be scanned.        Yhacinth LeBoeuf [facility]

## 2023-11-03 ENCOUNTER — HOSPITAL ENCOUNTER (OUTPATIENT)
Dept: RADIOLOGY | Facility: HOSPITAL | Age: 38
Discharge: HOME | End: 2023-11-03
Attending: NURSE PRACTITIONER
Payer: MEDICAID

## 2023-11-03 ENCOUNTER — OFFICE VISIT (OUTPATIENT)
Dept: WOUND CARE | Facility: HOSPITAL | Age: 38
End: 2023-11-03
Attending: NURSE PRACTITIONER
Payer: MEDICAID

## 2023-11-03 ENCOUNTER — HOSPITAL ENCOUNTER (OUTPATIENT)
Dept: RADIOLOGY | Facility: HOSPITAL | Age: 38
Discharge: HOME | End: 2023-11-03
Attending: NEUROLOGICAL SURGERY
Payer: MEDICAID

## 2023-11-03 ENCOUNTER — TELEPHONE (OUTPATIENT)
Dept: NEUROSURGERY | Facility: CLINIC | Age: 38
End: 2023-11-03
Payer: MEDICAID

## 2023-11-03 VITALS
SYSTOLIC BLOOD PRESSURE: 124 MMHG | DIASTOLIC BLOOD PRESSURE: 74 MMHG | RESPIRATION RATE: 18 BRPM | HEART RATE: 81 BPM | TEMPERATURE: 98 F

## 2023-11-03 DIAGNOSIS — L97.512 ULCER OF TOE OF RIGHT FOOT, WITH FAT LAYER EXPOSED: ICD-10-CM

## 2023-11-03 DIAGNOSIS — M86.179 OTHER ACUTE OSTEOMYELITIS, UNSPECIFIED ANKLE AND FOOT: ICD-10-CM

## 2023-11-03 DIAGNOSIS — Q05.2 SPINA BIFIDA WITH HYDROCEPHALUS, LUMBAR REGION: Primary | ICD-10-CM

## 2023-11-03 DIAGNOSIS — L89.324 LEFT ISCHIAL PRESSURE SORE, STAGE IV: ICD-10-CM

## 2023-11-03 DIAGNOSIS — L89.893 PRESSURE INJURY OF RIGHT FOOT, STAGE 3: ICD-10-CM

## 2023-11-03 DIAGNOSIS — L89.213 STAGE III PRESSURE ULCER OF RIGHT HIP: ICD-10-CM

## 2023-11-03 DIAGNOSIS — L89.893 PRESSURE INJURY OF LEFT FOOT, STAGE 3: ICD-10-CM

## 2023-11-03 DIAGNOSIS — Z98.2 S/P VP SHUNT: ICD-10-CM

## 2023-11-03 DIAGNOSIS — L89.894 PRESSURE INJURY OF TOE OF RIGHT FOOT, STAGE 4: ICD-10-CM

## 2023-11-03 DIAGNOSIS — Q05.2 SPINA BIFIDA WITH HYDROCEPHALUS, LUMBAR REGION: ICD-10-CM

## 2023-11-03 PROCEDURE — 73720 MRI LWR EXTREMITY W/O&W/DYE: CPT | Mod: 26,RT,, | Performed by: INTERNAL MEDICINE

## 2023-11-03 PROCEDURE — 70250 X-RAY EXAM OF SKULL: CPT | Mod: 26,,, | Performed by: RADIOLOGY

## 2023-11-03 PROCEDURE — 73720 MRI FOOT (FOREFOOT) RIGHT W W/O CONTRAST: ICD-10-PCS | Mod: 26,RT,, | Performed by: INTERNAL MEDICINE

## 2023-11-03 PROCEDURE — 70250 X-RAY EXAM OF SKULL: CPT | Mod: TC

## 2023-11-03 PROCEDURE — 70250 XR SKULL SHUNT PLACEMENT 1 VIEW: ICD-10-PCS | Mod: 26,,, | Performed by: RADIOLOGY

## 2023-11-03 PROCEDURE — 73720 MRI LWR EXTREMITY W/O&W/DYE: CPT | Mod: TC,RT

## 2023-11-03 PROCEDURE — 25500020 PHARM REV CODE 255: Performed by: NURSE PRACTITIONER

## 2023-11-03 PROCEDURE — A9585 GADOBUTROL INJECTION: HCPCS | Performed by: NURSE PRACTITIONER

## 2023-11-03 PROCEDURE — 11042 DBRDMT SUBQ TIS 1ST 20SQCM/<: CPT

## 2023-11-03 RX ORDER — GADOBUTROL 604.72 MG/ML
7 INJECTION INTRAVENOUS
Status: COMPLETED | OUTPATIENT
Start: 2023-11-03 | End: 2023-11-03

## 2023-11-03 RX ADMIN — GADOBUTROL 10 ML: 604.72 INJECTION INTRAVENOUS at 11:11

## 2023-11-03 NOTE — TELEPHONE ENCOUNTER
Spoke to patient. I told her shunt setting hasn't been checked since 2011 and from we can tell, the setting has moved since, but we don't when it was moved.   I advised her to monitor for signs/symptoms of hydrocephalus and let us know if she develops any new /worsening symptoms.    I also told her we should schedule a follow up appointment with us to re-establish care. She said she will have to look at her schedule when she gets home and call us back to schedule.  I provided call back number

## 2023-11-03 NOTE — PROGRESS NOTES
[facility]  Wound Care  Progress Note    Problem List Items Addressed This Visit          Neuro    Spina bifida with hydrocephalus, lumbar region - Primary       Orthopedic    Pressure injury of right foot, stage 3    Left ischial pressure sore, stage IV    Overview     HPI: 38 yr old female with history of spina bifida, does not walk, was seen in the wound clinic years ago but recently hospitalized in [place] for multiple pressure ulcers, she presents today for wound care.    Location:  left ischium, right hip, left posterior ankle, left lateral foot, right medial great toe, right 4th toe, right heel    Duration:  left ischium: 3-27-23, right hip; 7-8-23, left posterior ankle: 8-14-23, left lateral foot: 10-29-23, right medial great toe: 10-1-23, right 4th toe:10-1-23, right heel: 10-1-23      Context: pressure    Associated Signs & Symptoms: denies pain    Timing: n/a    Severity: n/a    Quality: n/a    Modifying Factors: n/a    5-12-23; here for wound care for multiple pressure ulcers. She has several new ones to her buttocks.  5-19-23: here for wound care for multiple pressure ulcers   6-2-23: here for wound care for multiple pressure ulcers.  6-9-23: here for wound care for multiple pressure ulcers  6-15-23: here for wound care for multiple pressure ulcers  6-23-23: here for wound care for pressure ulcers  6-30-23: here for wound care for pressure ulcers.  7-14-23: here for wound care for pressure ulcers  7-21-23: here for wound care for pressure ulcers  8-4-23: here for wound care for multiple pressure ulcers.  8-11-23: here for wound care for multiple pressure ulcers.  8-18-23: here for wound care for multiple pressure ulcers  9-1-23: here for wound care for multiple pressure ulcers, new one to left posterior ankle  9-8-23: here for wound care for multiple pressure ulcers  9-15-23: here for wound care for pressure ulcers  9-22-23: Here for wound care for pressure ulcers  10-6-23: here  for wound care for pressure ulcers, she has 4 new ones  10-13-23: here for wound care for pressure ulcers   10-27-23: here for wound care for pressure ulcers, she has 2 new ones  11-3-23: here for wound care for pressure ulcers         Stage III pressure ulcer of right hip    Pressure injury of left foot, stage 3       See wound doc progress notes. Documents will be scanned.        [facility] Lacif [facility]  Progress Note    Problem List Items Addressed This Visit          Neuro    Spina bifida with hydrocephalus, lumbar region - Primary       Orthopedic    Pressure injury of right foot, stage 3    Left ischial pressure sore, stage IV    Overview     HPI: 38 yr old female with history of spina bifida, does not walk, was seen in the wound clinic years ago but recently hospitalized in [place] for multiple pressure ulcers, she presents today for wound care.    Location:  left ischium, right hip, left posterior ankle, left lateral foot, right medial great toe, right 4th toe, right heel    Duration:  left ischium: 3-27-23, right hip; 7-8-23, left posterior ankle: 8-14-23, left lateral foot: 10-29-23, right medial great toe: 10-1-23, right 4th toe:10-1-23, right heel: 10-1-23      Context: pressure    Associated Signs & Symptoms: denies pain    Timing: n/a    Severity: n/a    Quality: n/a    Modifying Factors: n/a    5-12-23; here for wound care for multiple pressure ulcers. She has several new ones to her buttocks.  5-19-23: here for wound care for multiple pressure ulcers   6-2-23: here for wound care for multiple pressure ulcers.  6-9-23: here for wound care for multiple pressure ulcers  6-15-23: here for wound care for multiple pressure ulcers  6-23-23: here for wound care for pressure ulcers  6-30-23: here for wound care for pressure ulcers.  7-14-23: here for wound care for pressure ulcers  7-21-23: here for wound care for pressure ulcers  8-4-23: here  for wound care for multiple pressure ulcers.  8-11-23: here for wound care for multiple pressure ulcers.  8-18-23: here for wound care for multiple pressure ulcers  9-1-23: here for wound care for multiple pressure ulcers, new one to left posterior ankle  9-8-23: here for wound care for multiple pressure ulcers  9-15-23: here for wound care for pressure ulcers  9-22-23: Here for wound care for pressure ulcers  10-6-23: here for wound care for pressure ulcers, she has 4 new ones  10-13-23: here for wound care for pressure ulcers   10-27-23: here for wound care for pressure ulcers, she has 2 new ones  11-3-23: here for wound care for pressure ulcers         Stage III pressure ulcer of right hip    Pressure injury of left foot, stage 3     Physical Exam  Vitals reviewed.   Constitutional:       Appearance: Normal appearance.   HENT:      Head: Normocephalic.   Pulmonary:      Effort: Pulmonary effort is normal.   Musculoskeletal:      Comments: Pt in a wheelchair d/t spina bifida   Skin:     General: Skin is warm and dry.      Comments: Stage 4 pressure ulcer to left ischium with adipose exposed, stage 3 pressure  ulcer to  right hip with adipose exposed,  stage 3 pressure ulcer to  left lateral foot has re-opened today with adipose exposed.   stage 3 pressure ulcer to right 4th toe with adipose exposed. stage 3 pressure ulcer to lower mid abdomen with adipose exposed   Neurological:      Mental Status: She is alert and oriented to person, place, and time.   Psychiatric:         Mood and Affect: Mood normal.         Behavior: Behavior normal.         Thought Content: Thought content normal.         Judgment: Judgment normal.     All ulcers debrided, pt again had large amount of stool in diaper that was also in her left ischial wound, ulcer will have great difficulty healing d/t stool and never having a dressing on it, she had a MRI today of her right foot that showed she has osteomyelitis in her right 4th toe, will  refer to ID, she is currently on bactrim ds po bid based off of last culture results. Pt was offered to be seen by our At home program but pt refused, she has a state worker that is always with her but the pt does all of her wound care herself. Will continue silver alginate qod to all except right hip will be santyl daily, keep all clean, dry and covered at all times. Will recheck in one week. Her potential to heal is fair.  See wound doc progress notes. Documents will be scanned.        Hyacinth Aguero  [facility]

## 2023-11-10 ENCOUNTER — OFFICE VISIT (OUTPATIENT)
Dept: WOUND CARE | Facility: HOSPITAL | Age: 38
End: 2023-11-10
Attending: NURSE PRACTITIONER
Payer: MEDICAID

## 2023-11-10 VITALS
DIASTOLIC BLOOD PRESSURE: 74 MMHG | RESPIRATION RATE: 18 BRPM | HEART RATE: 80 BPM | SYSTOLIC BLOOD PRESSURE: 130 MMHG | TEMPERATURE: 98 F

## 2023-11-10 DIAGNOSIS — Q05.2 SPINA BIFIDA WITH HYDROCEPHALUS, LUMBAR REGION: ICD-10-CM

## 2023-11-10 DIAGNOSIS — L89.893 PRESSURE ULCER OF OTHER SITE, STAGE 3: ICD-10-CM

## 2023-11-10 DIAGNOSIS — L89.324 LEFT ISCHIAL PRESSURE SORE, STAGE IV: Primary | ICD-10-CM

## 2023-11-10 DIAGNOSIS — L89.213 STAGE III PRESSURE ULCER OF RIGHT HIP: ICD-10-CM

## 2023-11-10 DIAGNOSIS — L89.894 PRESSURE INJURY OF TOE OF RIGHT FOOT, STAGE 4: ICD-10-CM

## 2023-11-10 DIAGNOSIS — L89.893 PRESSURE INJURY OF LEFT FOOT, STAGE 3: ICD-10-CM

## 2023-11-10 PROCEDURE — 11042 DBRDMT SUBQ TIS 1ST 20SQCM/<: CPT

## 2023-11-10 NOTE — PROGRESS NOTES
[facility]  Wound Care  Progress Note    Problem List Items Addressed This Visit          Orthopedic    Left ischial pressure sore, stage IV - Primary    Overview     HPI: 38 yr old female with history of spina bifida, does not walk, was seen in the wound clinic years ago but recently hospitalized in [place] for multiple pressure ulcers, she presents today for wound care.    Location:  left ischium, right hip, left posterior ankle, left lateral foot, right medial great toe, right 4th toe, right heel    Duration:  left ischium: 3-27-23, right hip; 7-8-23, left posterior ankle: 8-14-23, left lateral foot: 10-29-23, right medial great toe: 10-1-23, right 4th toe:10-1-23, right heel: 10-1-23      Context: pressure    Associated Signs & Symptoms: denies pain    Timing: n/a    Severity: n/a    Quality: n/a    Modifying Factors: n/a    5-12-23; here for wound care for multiple pressure ulcers. She has several new ones to her buttocks.  5-19-23: here for wound care for multiple pressure ulcers   6-2-23: here for wound care for multiple pressure ulcers.  6-9-23: here for wound care for multiple pressure ulcers  6-15-23: here for wound care for multiple pressure ulcers  6-23-23: here for wound care for pressure ulcers  6-30-23: here for wound care for pressure ulcers.  7-14-23: here for wound care for pressure ulcers  7-21-23: here for wound care for pressure ulcers  8-4-23: here for wound care for multiple pressure ulcers.  8-11-23: here for wound care for multiple pressure ulcers.  8-18-23: here for wound care for multiple pressure ulcers  9-1-23: here for wound care for multiple pressure ulcers, new one to left posterior ankle  9-8-23: here for wound care for multiple pressure ulcers  9-15-23: here for wound care for pressure ulcers  9-22-23: Here for wound care for pressure ulcers  10-6-23: here for wound care for pressure ulcers, she has 4 new ones  10-13-23: here for wound care for pressure ulcers    10-27-23: here for wound care for pressure ulcers, she has 2 new ones  11-3-23: here for wound care for pressure ulcers  11-10-23; here for wound care for pressure ulcers         Stage III pressure ulcer of right hip    Pressure injury of left foot, stage 3    Pressure ulcer of other site, stage 3    Pressure injury of toe of right foot, stage 4     Physical Exam  Constitutional:       Appearance: Normal appearance.   HENT:      Head: Normocephalic.   Pulmonary:      Effort: Pulmonary effort is normal.   Musculoskeletal:      Comments: Pt in a wheelchair d/t spina bifida   Skin:     General: Skin is warm and dry.      Comments: Stage 4 pressure ulcer to left ischium with adipose exposed, stage 3 pressure  ulcer to  right hip with adipose exposed,  stage 3 pressure ulcer to  left lateral foot has with adipose exposed.  stage 3 pressure ulcer to right 4th toe with adipose exposed. stage 3 pressure ulcer to lower mid abdomen with adipose exposed    Neurological:      Mental Status: She is alert and oriented to person, place, and time.   Psychiatric:         Mood and Affect: Mood normal.         Behavior: Behavior normal.         Thought Content: Thought content normal.         Judgment: Judgment normal.   Ulcers debrided, pt has appt with ID on 11-21-23, she is currently on bactrim ds po bid, pt was instructed by neurology to f/u regarding shunt but pt states she is not going to schedule an appt because she is fine, encouraged better compliance. She is currently having a telehealth mental health visit during her wound care appt. Will continue silver alginate qod to all except right hip is santyl daily, pt did not have a dressing on her left ischium when she arrived, will recheck in one week, her potential to heal is fair.  See wound doc progress notes. Documents will be scanned.        Hyacinth Aguero  [facility]

## 2023-11-17 ENCOUNTER — OFFICE VISIT (OUTPATIENT)
Dept: WOUND CARE | Facility: HOSPITAL | Age: 38
End: 2023-11-17
Attending: NURSE PRACTITIONER
Payer: MEDICAID

## 2023-11-17 VITALS
SYSTOLIC BLOOD PRESSURE: 123 MMHG | TEMPERATURE: 98 F | HEART RATE: 87 BPM | RESPIRATION RATE: 18 BRPM | DIASTOLIC BLOOD PRESSURE: 67 MMHG

## 2023-11-17 DIAGNOSIS — L89.324 LEFT ISCHIAL PRESSURE SORE, STAGE IV: ICD-10-CM

## 2023-11-17 DIAGNOSIS — S90.822A: ICD-10-CM

## 2023-11-17 DIAGNOSIS — L89.894 PRESSURE INJURY OF TOE OF RIGHT FOOT, STAGE 4: ICD-10-CM

## 2023-11-17 DIAGNOSIS — L89.893 PRESSURE ULCER OF OTHER SITE, STAGE 3: ICD-10-CM

## 2023-11-17 DIAGNOSIS — L89.213 STAGE III PRESSURE ULCER OF RIGHT HIP: Primary | ICD-10-CM

## 2023-11-17 PROCEDURE — 11042 DBRDMT SUBQ TIS 1ST 20SQCM/<: CPT

## 2023-11-17 NOTE — PROGRESS NOTES
[facility]  Wound Care  Progress Note    Problem List Items Addressed This Visit          Orthopedic    Left ischial pressure sore, stage IV    Overview     HPI: 38 yr old female with history of spina bifida, does not walk, was seen in the wound clinic years ago but recently hospitalized in [place] for multiple pressure ulcers, she presents today for wound care.    Location:  left ischium, right hip, left lateral foot, right 4th toe, mid abdomen, right lateral lower leg, left medial foot, left lateral 5th toe    Duration:  left ischium: 3-27-23, right hip; 23,left lateral foot: 10-29-23,  right 4th toe:10-1-23, mid abdomen: 10-21-23, right lateral lower le23, left medial foot: 23, left lateral 5th toe: 23      Context: all are pressure, left medial foot: non thermal blister.    Associated Signs & Symptoms: denies pain    Timing: n/a    Severity: n/a    Quality: n/a    Modifying Factors: n/a    23; here for wound care for multiple pressure ulcers. She has several new ones to her buttocks.  23: here for wound care for multiple pressure ulcers   6: here for wound care for multiple pressure ulcers.  6: here for wound care for multiple pressure ulcers  6-15-23: here for wound care for multiple pressure ulcers  623: here for wound care for pressure ulcers  623: here for wound care for pressure ulcers.  23: here for wound care for pressure ulcers  723: here for wound care for pressure ulcers  8-23: here for wound care for multiple pressure ulcers.  823: here for wound care for multiple pressure ulcers.  818-23: here for wound care for multiple pressure ulcers  9-23: here for wound care for multiple pressure ulcers, new one to left posterior ankle  9--23: here for wound care for multiple pressure ulcers  9-15-23: here for wound care for pressure ulcers  9-22-23: Here for wound care for pressure ulcers  10-23: here for wound care  for pressure ulcers, she has 4 new ones  10-13-23: here for wound care for pressure ulcers   10-27-23: here for wound care for pressure ulcers, she has 2 new ones  11-3-23: here for wound care for pressure ulcers  11-10-23; here for wound care for pressure ulcers  11-17-23: here for wound care for multiple pressure ulcers         Stage III pressure ulcer of right hip - Primary    Pressure ulcer of other site, stage 3    Pressure injury of toe of right foot, stage 4    Non-thermal blister of left foot   Physical Exam  Constitutional:       Appearance: Normal appearance.   Pulmonary:      Effort: Pulmonary effort is normal.   Musculoskeletal:      Comments: Pt in a wheelchair d/t spina bifida   Skin:     General: Skin is warm and dry.      Comments: Stage 4 pressure ulcer to left ischium with adipose exposed, stage 3 pressure  ulcer to  right hip with adipose exposed,  stage 3 pressure ulcer to  left lateral foot with adipose exposed.  stage 3 pressure ulcer to right 4th toe with adipose exposed. stage 3 pressure ulcer to lower mid abdomen is healed today, new stage 3 pressure ulcer to right lateral lower leg with adipose exposed, non thermal blister to left medial foot that is partial thickness, stage 3 pressure ulcer to left lateral 5th toe with adipose exposed.    Neurological:      Mental Status: She is alert and oriented to person, place, and time.   Psychiatric:         Mood and Affect: Mood normal.         Behavior: Behavior normal.         Thought Content: Thought content normal.         Judgment: Judgment normal.     Ulcers debrided, pt continues to develop new ulcers every week, she has an appt on Tuesday with Dr. Moffett for osteo of right 4th toe, her ischial ulcer has stool in it every time she comes, no dressing in place, will use silver alginate qod to all except left medial foot will use xeroform qod, pt must offload pressure and be more aware of her legs/feet, she states her left leg is always more  swollen than her right, may send to [facility] in the future, encourage compression stockings while out of bed, will recheck in 2 weeks, her potential to heal is fair.She is currently on bactrim ds po bid until seen by ID.    See wound doc progress notes. Documents will be scanned.        Hyacinth LeBoeuf [facility]

## 2023-12-01 ENCOUNTER — OFFICE VISIT (OUTPATIENT)
Dept: WOUND CARE | Facility: HOSPITAL | Age: 38
End: 2023-12-01
Attending: NURSE PRACTITIONER
Payer: MEDICAID

## 2023-12-01 VITALS
TEMPERATURE: 98 F | SYSTOLIC BLOOD PRESSURE: 108 MMHG | HEART RATE: 77 BPM | RESPIRATION RATE: 17 BRPM | DIASTOLIC BLOOD PRESSURE: 68 MMHG

## 2023-12-01 DIAGNOSIS — L89.213 STAGE III PRESSURE ULCER OF RIGHT HIP: ICD-10-CM

## 2023-12-01 DIAGNOSIS — S90.822A: ICD-10-CM

## 2023-12-01 DIAGNOSIS — Q05.2 SPINA BIFIDA WITH HYDROCEPHALUS, LUMBAR REGION: Primary | ICD-10-CM

## 2023-12-01 DIAGNOSIS — L89.324 LEFT ISCHIAL PRESSURE SORE, STAGE IV: ICD-10-CM

## 2023-12-01 DIAGNOSIS — L89.893 PRESSURE ULCER OF OTHER SITE, STAGE 3: ICD-10-CM

## 2023-12-01 DIAGNOSIS — L89.893 PRESSURE INJURY OF LEFT FOOT, STAGE 3: ICD-10-CM

## 2023-12-01 DIAGNOSIS — L89.894 PRESSURE INJURY OF TOE OF RIGHT FOOT, STAGE 4: ICD-10-CM

## 2023-12-01 PROCEDURE — 11042 DBRDMT SUBQ TIS 1ST 20SQCM/<: CPT

## 2023-12-01 NOTE — PROGRESS NOTES
[facility]  Wound Care  Progress Note    Problem List Items Addressed This Visit          Neuro    Spina bifida with hydrocephalus, lumbar region - Primary       Orthopedic    Left ischial pressure sore, stage IV    Overview     HPI: 38 yr old female with history of spina bifida, does not walk, was seen in the wound clinic years ago but recently hospitalized in [place] for multiple pressure ulcers, she presents today for wound care.    Location:  left ischium, right hip, left lateral foot, right 4th toe, mid abdomen, right lateral lower leg, left medial foot, left lateral 5th toe    Duration:  left ischium: 3-27-23, right hip; 23,left lateral foot: 10-29-23,  right 4th toe:10-1-23, mid abdomen: 10-21-23, right lateral lower le23, left medial foot: 23, left lateral 5th toe: 23      Context: all are pressure, left medial foot: non thermal blister.    Associated Signs & Symptoms: denies pain    Timing: n/a    Severity: n/a    Quality: n/a    Modifying Factors: n/a    23; here for wound care for multiple pressure ulcers. She has several new ones to her buttocks.  23: here for wound care for multiple pressure ulcers   6: here for wound care for multiple pressure ulcers.  6: here for wound care for multiple pressure ulcers  6-15-23: here for wound care for multiple pressure ulcers  6-23: here for wound care for pressure ulcers  6: here for wound care for pressure ulcers.  23: here for wound care for pressure ulcers  7-23: here for wound care for pressure ulcers  8-23: here for wound care for multiple pressure ulcers.  8-23: here for wound care for multiple pressure ulcers.  818-23: here for wound care for multiple pressure ulcers  9--23: here for wound care for multiple pressure ulcers, new one to left posterior ankle  9--23: here for wound care for multiple pressure ulcers  -15-23: here for wound care for pressure  ulcers  9-22-23: Here for wound care for pressure ulcers  10-6-23: here for wound care for pressure ulcers, she has 4 new ones  10-13-23: here for wound care for pressure ulcers   10-27-23: here for wound care for pressure ulcers, she has 2 new ones  11-3-23: here for wound care for pressure ulcers  11-10-23; here for wound care for pressure ulcers  11-17-23: here for wound care for multiple pressure ulcers  12-1-23: here for wound care for multiple pressure ulcers and a non thermal blister to left foot.         Stage III pressure ulcer of right hip    Pressure injury of left foot, stage 3    Pressure ulcer of other site, stage 3    Pressure injury of toe of right foot, stage 4    Non-thermal blister of left foot     Physical Exam  HENT:      Head: Normocephalic.   Pulmonary:      Effort: Pulmonary effort is normal.   Musculoskeletal:      Comments: In a wheelchair d/t spina bifida   Skin:     General: Skin is warm and dry.      Comments:  Stage 4 pressure ulcer to left ischium with adipose exposed, stage 3 pressure  ulcer to  right hip is healed,   stage 3 pressure ulcer to  left lateral foot with adipose exposed.  stage 4  pressure ulcer to right 4th toe with adipose exposed. stage 3 pressure ulcer to right lateral lower leg is healed,  non thermal blister to left medial foot that is partial thickness, stage 3 pressure ulcer to left lateral 5th toe with adipose exposed.     Neurological:      Mental Status: She is alert. Mental status is at baseline.   Psychiatric:         Mood and Affect: Mood normal.         Behavior: Behavior normal.         Thought Content: Thought content normal.         Judgment: Judgment normal.     Ulcers to right 4th toe and left ischium, pt was seen by ID and received a dose of IV Dalvance, all ulcers are better today, continue silver alginate qod, xeroform to blister, Keep clean and dry, offload pressure, has f/u with ID next week. Her potential to heal is good.  See wound doc progress  notes. Documents will be scanned.        Hyacinth CastleBoeuf [facility]

## 2023-12-15 ENCOUNTER — OFFICE VISIT (OUTPATIENT)
Dept: WOUND CARE | Facility: HOSPITAL | Age: 38
End: 2023-12-15
Attending: NURSE PRACTITIONER
Payer: MEDICAID

## 2023-12-15 VITALS — SYSTOLIC BLOOD PRESSURE: 116 MMHG | RESPIRATION RATE: 17 BRPM | DIASTOLIC BLOOD PRESSURE: 76 MMHG | HEART RATE: 78 BPM

## 2023-12-15 DIAGNOSIS — Q05.2 SPINA BIFIDA WITH HYDROCEPHALUS, LUMBAR REGION: ICD-10-CM

## 2023-12-15 DIAGNOSIS — L89.213 STAGE III PRESSURE ULCER OF RIGHT HIP: ICD-10-CM

## 2023-12-15 DIAGNOSIS — L89.893 PRESSURE INJURY OF LEFT FOOT, STAGE 3: ICD-10-CM

## 2023-12-15 DIAGNOSIS — L89.324 LEFT ISCHIAL PRESSURE SORE, STAGE IV: Primary | ICD-10-CM

## 2023-12-15 PROCEDURE — 11042 DBRDMT SUBQ TIS 1ST 20SQCM/<: CPT

## 2023-12-15 NOTE — PROGRESS NOTES
[facility]  Wound Care  Progress Note    Problem List Items Addressed This Visit          Neuro    Spina bifida with hydrocephalus, lumbar region       Orthopedic    Left ischial pressure sore, stage IV - Primary    Overview     HPI: 38 yr old female with history of spina bifida, does not walk, was seen in the wound clinic years ago but recently hospitalized in [place] for multiple pressure ulcers, she presents today for wound care.    Location:  left ischium, right hip, left lateral foot, right 4th toe, mid abdomen, right lateral lower leg, left medial foot, left lateral 5th toe    Duration:  left ischium: 3-27-23, right hip; 23,left lateral foot: 10-29-23,  right 4th toe:10-1-23, mid abdomen: 10-21-23, right lateral lower le23, left medial foot: 23, left lateral 5th toe: 23      Context: all are pressure, left medial foot: non thermal blister.    Associated Signs & Symptoms: denies pain    Timing: n/a    Severity: n/a    Quality: n/a    Modifying Factors: n/a    23; here for wound care for multiple pressure ulcers. She has several new ones to her buttocks.  23: here for wound care for multiple pressure ulcers   6: here for wound care for multiple pressure ulcers.  6: here for wound care for multiple pressure ulcers  6-15-23: here for wound care for multiple pressure ulcers  6-23: here for wound care for pressure ulcers  6: here for wound care for pressure ulcers.  23: here for wound care for pressure ulcers  7-23: here for wound care for pressure ulcers  8-23: here for wound care for multiple pressure ulcers.  8-23: here for wound care for multiple pressure ulcers.  818-23: here for wound care for multiple pressure ulcers  9--23: here for wound care for multiple pressure ulcers, new one to left posterior ankle  9--23: here for wound care for multiple pressure ulcers  -15-23: here for wound care for pressure  ulcers  9-22-23: Here for wound care for pressure ulcers  10-6-23: here for wound care for pressure ulcers, she has 4 new ones  10-13-23: here for wound care for pressure ulcers   10-27-23: here for wound care for pressure ulcers, she has 2 new ones  11-3-23: here for wound care for pressure ulcers  11-10-23; here for wound care for pressure ulcers  11-17-23: here for wound care for multiple pressure ulcers  12-1-23: here for wound care for multiple pressure ulcers and a non thermal blister to left foot.  12-15-23: here for wound care for multiple pressure ulcers         Stage III pressure ulcer of right hip    Pressure injury of left foot, stage 3     Physical Exam  Vitals reviewed.   Constitutional:       Appearance: Normal appearance.   HENT:      Head: Normocephalic.   Pulmonary:      Effort: Pulmonary effort is normal.   Musculoskeletal:      Comments: Pt in a wheelchair d/t spina bifida   Skin:     General: Skin is warm and dry.      Comments: Stage 4 pressure ulcer to left ischium with adipose exposed, stage 3 pressure  ulcer to  right hip has re-opened with adipose exposed,    stage 3 pressure ulcer to  left lateral foot is healed,   stage 4  pressure ulcer to right 4th toe is resolved. stage 3 pressure ulcer to left lateral 5th toe with adipose exposed.  Stage 3 pressure ulcer to left anterior ankle with adipose exposed.   Neurological:      Mental Status: She is alert and oriented to person, place, and time.   Psychiatric:         Mood and Affect: Mood normal.         Behavior: Behavior normal.         Thought Content: Thought content normal.         Judgment: Judgment normal.     Ulcers debrided except left anterior ankle, will continue silver alginate qod, pt was seen by ID last week, her ESR and CRP are rising, she was prescribed zyvox and levaquin, she is on levaquin but is waiting on a PA for the zyvox. Keep clean and dry, offload pressure at all times. Will recheck in one week, her potential to heal  is fair.  See wound doc progress notes. Documents will be scanned.        Hyacinth LeBoeuf [facility]

## 2023-12-22 ENCOUNTER — OFFICE VISIT (OUTPATIENT)
Dept: WOUND CARE | Facility: HOSPITAL | Age: 38
End: 2023-12-22
Attending: NURSE PRACTITIONER
Payer: MEDICAID

## 2023-12-22 VITALS
TEMPERATURE: 98 F | SYSTOLIC BLOOD PRESSURE: 115 MMHG | DIASTOLIC BLOOD PRESSURE: 67 MMHG | HEART RATE: 78 BPM | RESPIRATION RATE: 17 BRPM

## 2023-12-22 DIAGNOSIS — Q05.2 SPINA BIFIDA WITH HYDROCEPHALUS, LUMBAR REGION: ICD-10-CM

## 2023-12-22 DIAGNOSIS — L89.324 LEFT ISCHIAL PRESSURE SORE, STAGE IV: Primary | ICD-10-CM

## 2023-12-22 DIAGNOSIS — L89.213 STAGE III PRESSURE ULCER OF RIGHT HIP: ICD-10-CM

## 2023-12-22 DIAGNOSIS — L89.893 PRESSURE INJURY OF LEFT FOOT, STAGE 3: ICD-10-CM

## 2023-12-22 PROCEDURE — 11042 DBRDMT SUBQ TIS 1ST 20SQCM/<: CPT

## 2023-12-22 NOTE — PROGRESS NOTES
[facility]  Wound Care  Progress Note    Problem List Items Addressed This Visit          Neuro    Spina bifida with hydrocephalus, lumbar region       Orthopedic    Left ischial pressure sore, stage IV - Primary    Overview     HPI: 38 yr old female with history of spina bifida, does not walk, was seen in the wound clinic years ago but recently hospitalized in [place] for multiple pressure ulcers, she presents today for wound care.    Location:  left ischium, right hip, left anterior ankle,  left lateral 5th toe, left medial 4th toe    Duration:  left ischium: 3-27-23, right hip; 7-8-23, left lateral 5th toe: 11-13-23, left medial 4th toe: 12-16-23, left anterior ankle: 12-9-23      Context: all are pressure    Associated Signs & Symptoms: denies pain    Timing: n/a    Severity: n/a    Quality: n/a    Modifying Factors: n/a    5-12-23; here for wound care for multiple pressure ulcers. She has several new ones to her buttocks.  5-19-23: here for wound care for multiple pressure ulcers   6-2-23: here for wound care for multiple pressure ulcers.  6-9-23: here for wound care for multiple pressure ulcers  6-15-23: here for wound care for multiple pressure ulcers  6-23-23: here for wound care for pressure ulcers  6-30-23: here for wound care for pressure ulcers.  7-14-23: here for wound care for pressure ulcers  7-21-23: here for wound care for pressure ulcers  8-4-23: here for wound care for multiple pressure ulcers.  8-11-23: here for wound care for multiple pressure ulcers.  8-18-23: here for wound care for multiple pressure ulcers  9-1-23: here for wound care for multiple pressure ulcers, new one to left posterior ankle  9-8-23: here for wound care for multiple pressure ulcers  9-15-23: here for wound care for pressure ulcers  9-22-23: Here for wound care for pressure ulcers  10-6-23: here for wound care for pressure ulcers, she has 4 new ones  10-13-23: here for wound care for pressure ulcers    10-27-23: here for wound care for pressure ulcers, she has 2 new ones  11-3-23: here for wound care for pressure ulcers  11-10-23; here for wound care for pressure ulcers  11-17-23: here for wound care for multiple pressure ulcers  12-1-23: here for wound care for multiple pressure ulcers and a non thermal blister to left foot.  12-15-23: here for wound care for multiple pressure ulcers  12-22-23: here for wound care for multiple pressure ulcers         Stage III pressure ulcer of right hip    Pressure injury of left foot, stage 3     Physical Exam  Constitutional:       Appearance: Normal appearance.   HENT:      Head: Normocephalic.   Pulmonary:      Effort: Pulmonary effort is normal.   Musculoskeletal:      Comments: Pt in a wheelchair d/t spina bifida   Skin:     General: Skin is warm and dry.      Comments:  Stage 4 pressure ulcer to left ischium with adipose exposed, stage 3 pressure  ulcer to  right hip with adipose exposed, stage 3 pressure ulcer to left lateral 5th toe with adipose exposed.  Stage 3 pressure ulcer to left anterior ankle with adipose exposed. Stage 3 pressure ulcer to left medial 4th toe with adipose exposed.   Neurological:      Mental Status: She is alert and oriented to person, place, and time.   Psychiatric:         Mood and Affect: Mood normal.         Behavior: Behavior normal.         Thought Content: Thought content normal.         Judgment: Judgment normal.     Ulcers except left anterior ankle debrided, pt has new ulcers almost every week. Pt has not received zyvox ordered per ID, pt has not called to check on it, messaged ID and will start PA. Continue silver alginate qod to all, keep clean and dry, offload pressure at all times and will recheck in one week, her potential to heal is fair.  See wound doc progress notes. Documents will be scanned.        Hyacinth CastleBoeuf [facility]

## 2023-12-29 ENCOUNTER — OFFICE VISIT (OUTPATIENT)
Dept: WOUND CARE | Facility: HOSPITAL | Age: 38
End: 2023-12-29
Attending: NURSE PRACTITIONER
Payer: MEDICAID

## 2023-12-29 VITALS
RESPIRATION RATE: 17 BRPM | HEART RATE: 73 BPM | SYSTOLIC BLOOD PRESSURE: 118 MMHG | DIASTOLIC BLOOD PRESSURE: 64 MMHG | TEMPERATURE: 98 F

## 2023-12-29 DIAGNOSIS — L89.523 STAGE III PRESSURE ULCER OF LEFT ANKLE: ICD-10-CM

## 2023-12-29 DIAGNOSIS — L89.513 STAGE III PRESSURE ULCER OF RIGHT ANKLE: ICD-10-CM

## 2023-12-29 DIAGNOSIS — L89.213 STAGE III PRESSURE ULCER OF RIGHT HIP: ICD-10-CM

## 2023-12-29 DIAGNOSIS — L89.893 PRESSURE ULCER OF OTHER SITE, STAGE 3: ICD-10-CM

## 2023-12-29 DIAGNOSIS — L89.324 LEFT ISCHIAL PRESSURE SORE, STAGE IV: Primary | ICD-10-CM

## 2023-12-29 PROCEDURE — 11042 DBRDMT SUBQ TIS 1ST 20SQCM/<: CPT

## 2023-12-29 NOTE — PROGRESS NOTES
[facility]  Wound Care  Progress Note    Problem List Items Addressed This Visit          Orthopedic    Left ischial pressure sore, stage IV - Primary    Overview     HPI: 38 yr old female with history of spina bifida, does not walk, was seen in the wound clinic years ago but recently hospitalized in [place] for multiple pressure ulcers, she presents today for wound care.    Location:  left ischium, right hip, left anterior ankle,  left lateral 5th toe, left medial 4th toe, left dorsal 3rd toe, right anterior lateral ankle    Duration:  left ischium: 3-27-23, right hip; 7-8-23, left lateral 5th toe: 11-13-23, left medial 4th toe: 12-16-23, left anterior ankle: 12-9-23, left dorsal 3rd toe: 12-26-23, right anterior ankle: 12-25-23      Context: all are pressure    Associated Signs & Symptoms: denies pain    Timing: n/a    Severity: n/a    Quality: n/a    Modifying Factors: n/a    5-12-23; here for wound care for multiple pressure ulcers. She has several new ones to her buttocks.  5-19-23: here for wound care for multiple pressure ulcers   6-2-23: here for wound care for multiple pressure ulcers.  6-9-23: here for wound care for multiple pressure ulcers  6-15-23: here for wound care for multiple pressure ulcers  6-23-23: here for wound care for pressure ulcers  6-30-23: here for wound care for pressure ulcers.  7-14-23: here for wound care for pressure ulcers  7-21-23: here for wound care for pressure ulcers  8-4-23: here for wound care for multiple pressure ulcers.  8-11-23: here for wound care for multiple pressure ulcers.  8-18-23: here for wound care for multiple pressure ulcers  9-1-23: here for wound care for multiple pressure ulcers, new one to left posterior ankle  9-8-23: here for wound care for multiple pressure ulcers  9-15-23: here for wound care for pressure ulcers  9-22-23: Here for wound care for pressure ulcers  10-6-23: here for wound care for pressure ulcers, she has 4 new  ones  10-13-23: here for wound care for pressure ulcers   10-27-23: here for wound care for pressure ulcers, she has 2 new ones  11-3-23: here for wound care for pressure ulcers  11-10-23; here for wound care for pressure ulcers  11-17-23: here for wound care for multiple pressure ulcers  12-1-23: here for wound care for multiple pressure ulcers and a non thermal blister to left foot.  12-15-23: here for wound care for multiple pressure ulcers  12-22-23: here for wound care for multiple pressure ulcers  12-29-23: here for wound care for multiple pressure ulcers including 2 new areas.         Stage III pressure ulcer of right ankle    Stage III pressure ulcer of right hip    Stage III pressure ulcer of left ankle    Pressure ulcer of other site, stage 3     Physical Exam  Vitals reviewed.   Constitutional:       Appearance: Normal appearance.   HENT:      Head: Normocephalic.   Pulmonary:      Effort: Pulmonary effort is normal.   Musculoskeletal:      Comments: In a wheelchair d/t spina bifida   Skin:     General: Skin is warm and dry.      Comments: Stage 4 pressure ulcer to left ischium with adipose exposed, stage 3 pressure  ulcer to  right hip with adipose exposed, stage 3 pressure ulcer to left lateral 5th toe is healed today,  Stage 3 pressure ulcer to left anterior ankle with adipose exposed. Stage 3 pressure ulcer to left medial 4th toe with adipose exposed. Stage 3 pressure ulcer to left dorsal 3rd toe with adipose exposed, stage 3 pressure ulcer to right anterior ankle with adipose exposed.   Neurological:      Mental Status: She is alert. Mental status is at baseline.   Psychiatric:         Mood and Affect: Mood normal.         Behavior: Behavior normal.         Thought Content: Thought content normal.         Judgment: Judgment normal.     Pt with 2 new pressure ulcers to her toe and foot, Discussed how this keeps happening, pt states because she doesn't feel her feet which is true but explained to her  she needs to be more aware of her feet when transferring, Her zyvox was denies per her insurance, she sees Dr Moffett next week, to decide on treatment options, Pt should be getting a new wheelchair soon. Ulcer to right hip debrided and left ischium debrided, will continue silver alginate qod, keep clean and dry, pt frequently has stool in her ischial ulcer. Her potential to heal is fair.  See wound doc progress notes. Documents will be scanned.        Hyacinth Aguero  [facility]

## 2024-01-05 PROBLEM — N20.0 NEPHROLITHIASIS: Status: ACTIVE | Noted: 2020-07-28

## 2024-01-12 ENCOUNTER — OFFICE VISIT (OUTPATIENT)
Dept: WOUND CARE | Facility: HOSPITAL | Age: 39
End: 2024-01-12
Attending: NURSE PRACTITIONER
Payer: MEDICAID

## 2024-01-12 VITALS
RESPIRATION RATE: 17 BRPM | HEART RATE: 82 BPM | SYSTOLIC BLOOD PRESSURE: 124 MMHG | DIASTOLIC BLOOD PRESSURE: 79 MMHG | TEMPERATURE: 98 F

## 2024-01-12 DIAGNOSIS — L89.893 PRESSURE ULCER OF OTHER SITE, STAGE 3: ICD-10-CM

## 2024-01-12 DIAGNOSIS — L89.324 LEFT ISCHIAL PRESSURE SORE, STAGE IV: Primary | ICD-10-CM

## 2024-01-12 DIAGNOSIS — L89.213 STAGE III PRESSURE ULCER OF RIGHT HIP: ICD-10-CM

## 2024-01-12 DIAGNOSIS — L89.513 STAGE III PRESSURE ULCER OF RIGHT ANKLE: ICD-10-CM

## 2024-01-12 DIAGNOSIS — L89.613 STAGE III PRESSURE ULCER OF RIGHT HEEL: ICD-10-CM

## 2024-01-12 DIAGNOSIS — L89.523 STAGE III PRESSURE ULCER OF LEFT ANKLE: ICD-10-CM

## 2024-01-12 PROCEDURE — 11042 DBRDMT SUBQ TIS 1ST 20SQCM/<: CPT

## 2024-01-12 NOTE — PROGRESS NOTES
[facility]  Wound Care  Progress Note    Problem List Items Addressed This Visit          Orthopedic    Stage III pressure ulcer of right heel    Overview     HPI: 34 yr old female with history of hydrocephalus, in a wheelchair. Developed a stage 3 pressure ulcer to right heel due to lack of sensation    Location: right heel     Duration: 9-14-18     Context: heel is  Pressure     Associated Signs & Symptoms: denies pain    Timing: n/a    Severity: n/a    Quality: n/a    Modifying Factors: n/a    10-26-18: Here for wound care to right medial heel and right 4th toe wound  11-9-18: here for wound care to right medial heel and right 4th toe wounds  11-16-18: here for wound care for right medial heel wound  11-30-18: here for wound care for right medial heel pressure ulcer.  12-7-18: here for wound care for right medial heel pressure ulcer.  12-14-18: here for wound care to right medial heel  12-21-18:  Here for wound care to right medial heel.  12-28-18: here for wound care to right medial heel pressure ulcer  1-4-19: here for wound care to right medial heel pressure ulcer  1-11-19: here for wound care to right medial heel pressure ulcer and a neuropathic ulcer to her right great toe that has been there for over a month.  1-18-19: here for wound care to right medial pressure ulcer and right great toe neuropathic ulcer  1-25-19: here for wound care for right medial heel stage 3 pressure ulcer and right great toe neuropathic ulcer  2-1-19: here for wound care for right medial heel stage 3 pressure ulcer and neuropathic ulcer to right great toe.  2-8-19: here for wound care for right medial heel stage 3 pressure ulcer  2-15-19: here for wound care for right heel pressure ulcer  2-22-19: here for wound care for right heel pressure ulcer  3-1-19: here for wound care for right heel pressure ulcer  3-8-19: here for wound care for right heel pressure ulcer  3-15-19: here for wound care for right stage 3  pressure ulcer  3-22-19: here for wound care for stage 3 pressure ulcer to her right heel  3-29-19: here for wound care for stage 3 pressure ulcer to right heel  4-5-19: here for wound care for stage 3 pressure ulcer to right heel.  4-26-19: here for wound care for stage 3 pressure ulcer to right heel and a new neuropathic ulcer to right great toe.  5-3-19: here for wound care for stage 3 pressure ulcer to right heel and a  neuropathic ulcer to right great toe.  5-17-19: here for wound care for stage 3 pressure ulcer to right heel, right toe is healed today  5-24-19: here for wound care for stage 3 pressure ulcer to right heel  5-31-19: here for wound care stage 3 pressure ulcer to right heel  6-14-19: here for wound care for stage 3 pressure ulcer to her right heel.  6-21-19: here for wound care for stage 3 pressure ulcer to right heel         Left ischial pressure sore, stage IV - Primary    Overview     HPI: 38 yr old female with history of spina bifida, does not walk, was seen in the wound clinic years ago but recently hospitalized in [place] for multiple pressure ulcers, she presents today for wound care.    Location:  left ischium, right hip, left anterior ankle,  left lateral 5th toe, left medial 4th toe, left dorsal 3rd toe, right anterior lateral ankle, right heel    Duration:  left ischium: 3-27-23, right hip; 7-8-23, left lateral 5th toe: 11-13-23, left medial 4th toe: 12-16-23, left anterior ankle: 12-9-23, left dorsal 3rd toe: 12-26-23, right anterior ankle: 12-25-23, right heel: 1-8-24      Context: all are pressure    Associated Signs & Symptoms: denies pain    Timing: n/a    Severity: n/a    Quality: n/a    Modifying Factors: n/a    5-12-23; here for wound care for multiple pressure ulcers. She has several new ones to her buttocks.  5-19-23: here for wound care for multiple pressure ulcers   6-2-23: here for wound care for multiple pressure ulcers.  6-9-23: here for wound care for multiple pressure  ulcers  6-15-23: here for wound care for multiple pressure ulcers  6-23-23: here for wound care for pressure ulcers  6-30-23: here for wound care for pressure ulcers.  7-14-23: here for wound care for pressure ulcers  7-21-23: here for wound care for pressure ulcers  8-4-23: here for wound care for multiple pressure ulcers.  8-11-23: here for wound care for multiple pressure ulcers.  8-18-23: here for wound care for multiple pressure ulcers  9-1-23: here for wound care for multiple pressure ulcers, new one to left posterior ankle  9-8-23: here for wound care for multiple pressure ulcers  9-15-23: here for wound care for pressure ulcers  9-22-23: Here for wound care for pressure ulcers  10-6-23: here for wound care for pressure ulcers, she has 4 new ones  10-13-23: here for wound care for pressure ulcers   10-27-23: here for wound care for pressure ulcers, she has 2 new ones  11-3-23: here for wound care for pressure ulcers  11-10-23; here for wound care for pressure ulcers  11-17-23: here for wound care for multiple pressure ulcers  12-1-23: here for wound care for multiple pressure ulcers and a non thermal blister to left foot.  12-15-23: here for wound care for multiple pressure ulcers  12-22-23: here for wound care for multiple pressure ulcers  12-29-23: here for wound care for multiple pressure ulcers including 2 new areas.  1-12-24: here for wound care for multiple pressure ulcers, one new ulcer today         Stage III pressure ulcer of right ankle    Stage III pressure ulcer of right hip    Stage III pressure ulcer of left ankle    Pressure ulcer of other site, stage 3     Physical Exam  Vitals reviewed.   Constitutional:       Appearance: Normal appearance.   HENT:      Head: Normocephalic.   Pulmonary:      Effort: Pulmonary effort is normal.   Abdominal:      Comments: Pt frequently with stool in ulcer   Genitourinary:     Comments: Pt frequently wet with urine  Musculoskeletal:      Comments: Pt in a  wheelchair d/t spina bifida   Skin:     General: Skin is warm and dry.      Comments: Stage 4 pressure ulcer to left ischium with adipose exposed, stage 3 pressure  ulcer to  right hip with adipose exposed, Stage 3 pressure ulcer to left anterior ankle with adipose exposed. Stage 3 pressure ulcer to left medial 4th toe with adipose exposed. Stage 3 pressure ulcer to left dorsal 3rd toe with adipose exposed, stage 3 pressure ulcer to right anterior ankle with adipose exposed. Stage 3 pressure ulcer to right heel with adipose exposed.   Neurological:      Mental Status: She is alert and oriented to person, place, and time.   Psychiatric:         Mood and Affect: Mood normal.         Behavior: Behavior normal.         Thought Content: Thought content normal.         Judgment: Judgment normal.     Ulcers debrided, pt with a new ulcer to right heel. Diaper continues to dig into right hip, instructed again to avoid fastening diaper while in bed, encouraged to stage clean and dry, will use silver alginate to all qod except santyl to left anterior ankle and right anterior ankle. Cover with xeroform, keep clean and dry and will recheck in one week, pt saw ID last week and was put on levaquin, pt states she is not taking it because it upsets her stomach, instructed to notify ID asap.   See wound doc progress notes. Documents will be scanned.        Hyacinth LeBoeuf [facility]

## 2024-01-19 ENCOUNTER — OFFICE VISIT (OUTPATIENT)
Dept: WOUND CARE | Facility: HOSPITAL | Age: 39
End: 2024-01-19
Attending: NURSE PRACTITIONER
Payer: MEDICAID

## 2024-01-19 VITALS
TEMPERATURE: 98 F | HEART RATE: 78 BPM | DIASTOLIC BLOOD PRESSURE: 76 MMHG | RESPIRATION RATE: 17 BRPM | SYSTOLIC BLOOD PRESSURE: 119 MMHG

## 2024-01-19 DIAGNOSIS — L89.523 STAGE III PRESSURE ULCER OF LEFT ANKLE: ICD-10-CM

## 2024-01-19 DIAGNOSIS — Q05.2 SPINA BIFIDA WITH HYDROCEPHALUS, LUMBAR REGION: ICD-10-CM

## 2024-01-19 DIAGNOSIS — L89.613 STAGE III PRESSURE ULCER OF RIGHT HEEL: ICD-10-CM

## 2024-01-19 DIAGNOSIS — L89.893 PRESSURE ULCER OF OTHER SITE, STAGE 3: ICD-10-CM

## 2024-01-19 DIAGNOSIS — L89.893 PRESSURE INJURY OF LEFT FOOT, STAGE 3: ICD-10-CM

## 2024-01-19 DIAGNOSIS — L89.513 STAGE III PRESSURE ULCER OF RIGHT ANKLE: ICD-10-CM

## 2024-01-19 DIAGNOSIS — L89.324 LEFT ISCHIAL PRESSURE SORE, STAGE IV: Primary | ICD-10-CM

## 2024-01-19 DIAGNOSIS — L89.213 STAGE III PRESSURE ULCER OF RIGHT HIP: ICD-10-CM

## 2024-01-19 PROCEDURE — 11042 DBRDMT SUBQ TIS 1ST 20SQCM/<: CPT

## 2024-01-19 NOTE — PROGRESS NOTES
[facility]  Wound Care  Progress Note    Problem List Items Addressed This Visit          Neuro    Spina bifida with hydrocephalus, lumbar region       Orthopedic    Stage III pressure ulcer of right heel    Overview     HPI: 34 yr old female with history of hydrocephalus, in a wheelchair. Developed a stage 3 pressure ulcer to right heel due to lack of sensation    Location: right heel     Duration: 9-14-18     Context: heel is  Pressure     Associated Signs & Symptoms: denies pain    Timing: n/a    Severity: n/a    Quality: n/a    Modifying Factors: n/a    10-26-18: Here for wound care to right medial heel and right 4th toe wound  11-9-18: here for wound care to right medial heel and right 4th toe wounds  11-16-18: here for wound care for right medial heel wound  11-30-18: here for wound care for right medial heel pressure ulcer.  12-7-18: here for wound care for right medial heel pressure ulcer.  12-14-18: here for wound care to right medial heel  12-21-18:  Here for wound care to right medial heel.  12-28-18: here for wound care to right medial heel pressure ulcer  1-4-19: here for wound care to right medial heel pressure ulcer  1-11-19: here for wound care to right medial heel pressure ulcer and a neuropathic ulcer to her right great toe that has been there for over a month.  1-18-19: here for wound care to right medial pressure ulcer and right great toe neuropathic ulcer  1-25-19: here for wound care for right medial heel stage 3 pressure ulcer and right great toe neuropathic ulcer  2-1-19: here for wound care for right medial heel stage 3 pressure ulcer and neuropathic ulcer to right great toe.  2-8-19: here for wound care for right medial heel stage 3 pressure ulcer  2-15-19: here for wound care for right heel pressure ulcer  2-22-19: here for wound care for right heel pressure ulcer  3-1-19: here for wound care for right heel pressure ulcer  3-8-19: here for wound care for right heel  pressure ulcer  3-15-19: here for wound care for right stage 3 pressure ulcer  3-22-19: here for wound care for stage 3 pressure ulcer to her right heel  3-29-19: here for wound care for stage 3 pressure ulcer to right heel  4-5-19: here for wound care for stage 3 pressure ulcer to right heel.  4-26-19: here for wound care for stage 3 pressure ulcer to right heel and a new neuropathic ulcer to right great toe.  5-3-19: here for wound care for stage 3 pressure ulcer to right heel and a  neuropathic ulcer to right great toe.  5-17-19: here for wound care for stage 3 pressure ulcer to right heel, right toe is healed today  5-24-19: here for wound care for stage 3 pressure ulcer to right heel  5-31-19: here for wound care stage 3 pressure ulcer to right heel  6-14-19: here for wound care for stage 3 pressure ulcer to her right heel.  6-21-19: here for wound care for stage 3 pressure ulcer to right heel         Left ischial pressure sore, stage IV - Primary    Overview     HPI: 38 yr old female with history of spina bifida, does not walk, was seen in the wound clinic years ago but recently hospitalized in [place] for multiple pressure ulcers, she presents today for wound care.    Location:  left ischium, right hip, left anterior ankle,  left lateral 5th toe, left dorsal 3rd toe, right anterior lateral ankle, right heel    Duration:  left ischium: 3-27-23, right hip; 7-8-23, left lateral 5th toe: 11-13-23,  left anterior ankle: 12-9-23, left dorsal 3rd toe: 12-26-23, right anterior ankle: 12-25-23, right heel: 1-8-24      Context: all are pressure    Associated Signs & Symptoms: denies pain    Timing: n/a    Severity: n/a    Quality: n/a    Modifying Factors: n/a    5-12-23; here for wound care for multiple pressure ulcers. She has several new ones to her buttocks.  5-19-23: here for wound care for multiple pressure ulcers   6-2-23: here for wound care for multiple pressure ulcers.  6-9-23: here for wound care for  multiple pressure ulcers  6-15-23: here for wound care for multiple pressure ulcers  6-23-23: here for wound care for pressure ulcers  6-30-23: here for wound care for pressure ulcers.  7-14-23: here for wound care for pressure ulcers  7-21-23: here for wound care for pressure ulcers  8-4-23: here for wound care for multiple pressure ulcers.  8-11-23: here for wound care for multiple pressure ulcers.  8-18-23: here for wound care for multiple pressure ulcers  9-1-23: here for wound care for multiple pressure ulcers, new one to left posterior ankle  9-8-23: here for wound care for multiple pressure ulcers  9-15-23: here for wound care for pressure ulcers  9-22-23: Here for wound care for pressure ulcers  10-6-23: here for wound care for pressure ulcers, she has 4 new ones  10-13-23: here for wound care for pressure ulcers   10-27-23: here for wound care for pressure ulcers, she has 2 new ones  11-3-23: here for wound care for pressure ulcers  11-10-23; here for wound care for pressure ulcers  11-17-23: here for wound care for multiple pressure ulcers  12-1-23: here for wound care for multiple pressure ulcers and a non thermal blister to left foot.  12-15-23: here for wound care for multiple pressure ulcers  12-22-23: here for wound care for multiple pressure ulcers  12-29-23: here for wound care for multiple pressure ulcers including 2 new areas.  1-12-24: here for wound care for multiple pressure ulcers, one new ulcer today  1-19-24: here for wound care for multiple pressure ulcers         Stage III pressure ulcer of right ankle    Stage III pressure ulcer of right hip    Stage III pressure ulcer of left ankle    Pressure injury of left foot, stage 3    Pressure ulcer of other site, stage 3     Physical Exam  Vitals reviewed.   Constitutional:       Appearance: Normal appearance.   HENT:      Head: Normocephalic.   Pulmonary:      Effort: Pulmonary effort is normal.   Musculoskeletal:         General: Normal range  of motion.   Skin:     General: Skin is warm and dry.      Comments: Stage 4 pressure ulcer to left ischium with adipose exposed, stage 3 pressure  ulcer to  right hip with adipose exposed, Stage 3 pressure ulcer to left anterior ankle with adipose exposed. stage 3 pressure ulcer to right anterior ankle with adipose exposed. Stage 3 pressure ulcer to right heel with adipose exposed.    Neurological:      Mental Status: She is alert and oriented to person, place, and time.   Psychiatric:         Mood and Affect: Mood normal.         Behavior: Behavior normal.         Thought Content: Thought content normal.         Judgment: Judgment normal.     Ulcers debrided, will continue silver alginate to all qod except left and right anterior ankles will use santyl daily, keep clean and dry and will recheck in one week, her potential to heal is fair, she is currently being seen by ID and is supposed to be taking keflex 500 mg qid but pt states at most she takes it twice a day because it upsets her stomach. Instructed her to contact Dr. Moffett to let her know.   See wound doc progress notes. Documents will be scanned.        Hyacinth Laci  [facility]

## 2024-01-26 ENCOUNTER — OFFICE VISIT (OUTPATIENT)
Dept: WOUND CARE | Facility: HOSPITAL | Age: 39
End: 2024-01-26
Attending: NURSE PRACTITIONER
Payer: MEDICAID

## 2024-01-26 VITALS
DIASTOLIC BLOOD PRESSURE: 74 MMHG | HEART RATE: 77 BPM | RESPIRATION RATE: 18 BRPM | TEMPERATURE: 98 F | SYSTOLIC BLOOD PRESSURE: 122 MMHG

## 2024-01-26 DIAGNOSIS — L89.523 STAGE III PRESSURE ULCER OF LEFT ANKLE: ICD-10-CM

## 2024-01-26 DIAGNOSIS — L89.213 STAGE III PRESSURE ULCER OF RIGHT HIP: ICD-10-CM

## 2024-01-26 DIAGNOSIS — L89.324 LEFT ISCHIAL PRESSURE SORE, STAGE IV: Primary | ICD-10-CM

## 2024-01-26 DIAGNOSIS — L89.613 STAGE III PRESSURE ULCER OF RIGHT HEEL: ICD-10-CM

## 2024-01-26 DIAGNOSIS — L89.513 STAGE III PRESSURE ULCER OF RIGHT ANKLE: ICD-10-CM

## 2024-01-26 PROCEDURE — 11042 DBRDMT SUBQ TIS 1ST 20SQCM/<: CPT

## 2024-01-26 NOTE — PROGRESS NOTES
[facility]  Wound Care  Progress Note    Problem List Items Addressed This Visit          Orthopedic    Stage III pressure ulcer of right heel    Overview     HPI: 34 yr old female with history of hydrocephalus, in a wheelchair. Developed a stage 3 pressure ulcer to right heel due to lack of sensation    Location: right heel     Duration: 9-14-18     Context: heel is  Pressure     Associated Signs & Symptoms: denies pain    Timing: n/a    Severity: n/a    Quality: n/a    Modifying Factors: n/a    10-26-18: Here for wound care to right medial heel and right 4th toe wound  11-9-18: here for wound care to right medial heel and right 4th toe wounds  11-16-18: here for wound care for right medial heel wound  11-30-18: here for wound care for right medial heel pressure ulcer.  12-7-18: here for wound care for right medial heel pressure ulcer.  12-14-18: here for wound care to right medial heel  12-21-18:  Here for wound care to right medial heel.  12-28-18: here for wound care to right medial heel pressure ulcer  1-4-19: here for wound care to right medial heel pressure ulcer  1-11-19: here for wound care to right medial heel pressure ulcer and a neuropathic ulcer to her right great toe that has been there for over a month.  1-18-19: here for wound care to right medial pressure ulcer and right great toe neuropathic ulcer  1-25-19: here for wound care for right medial heel stage 3 pressure ulcer and right great toe neuropathic ulcer  2-1-19: here for wound care for right medial heel stage 3 pressure ulcer and neuropathic ulcer to right great toe.  2-8-19: here for wound care for right medial heel stage 3 pressure ulcer  2-15-19: here for wound care for right heel pressure ulcer  2-22-19: here for wound care for right heel pressure ulcer  3-1-19: here for wound care for right heel pressure ulcer  3-8-19: here for wound care for right heel pressure ulcer  3-15-19: here for wound care for right stage 3  pressure ulcer  3-22-19: here for wound care for stage 3 pressure ulcer to her right heel  3-29-19: here for wound care for stage 3 pressure ulcer to right heel  4-5-19: here for wound care for stage 3 pressure ulcer to right heel.  4-26-19: here for wound care for stage 3 pressure ulcer to right heel and a new neuropathic ulcer to right great toe.  5-3-19: here for wound care for stage 3 pressure ulcer to right heel and a  neuropathic ulcer to right great toe.  5-17-19: here for wound care for stage 3 pressure ulcer to right heel, right toe is healed today  5-24-19: here for wound care for stage 3 pressure ulcer to right heel  5-31-19: here for wound care stage 3 pressure ulcer to right heel  6-14-19: here for wound care for stage 3 pressure ulcer to her right heel.  6-21-19: here for wound care for stage 3 pressure ulcer to right heel         Left ischial pressure sore, stage IV - Primary    Overview     HPI: 38 yr old female with history of spina bifida, does not walk, was seen in the wound clinic years ago but recently hospitalized in [place] for multiple pressure ulcers, she presents today for wound care.    Location:  left ischium, right hip, left anterior ankle,  left lateral 5th toe, left dorsal 3rd toe, right anterior lateral ankle, right heel    Duration:  left ischium: 3-27-23, right hip; 7-8-23, left lateral 5th toe: 11-13-23,  left anterior ankle: 12-9-23, left dorsal 3rd toe: 12-26-23, right anterior ankle: 12-25-23, right heel: 1-8-24      Context: all are pressure    Associated Signs & Symptoms: denies pain    Timing: n/a    Severity: n/a    Quality: n/a    Modifying Factors: n/a    5-12-23; here for wound care for multiple pressure ulcers. She has several new ones to her buttocks.  5-19-23: here for wound care for multiple pressure ulcers   6-2-23: here for wound care for multiple pressure ulcers.  6-9-23: here for wound care for multiple pressure ulcers  6-15-23: here for wound care for multiple  pressure ulcers  6-23-23: here for wound care for pressure ulcers  6-30-23: here for wound care for pressure ulcers.  7-14-23: here for wound care for pressure ulcers  7-21-23: here for wound care for pressure ulcers  8-4-23: here for wound care for multiple pressure ulcers.  8-11-23: here for wound care for multiple pressure ulcers.  8-18-23: here for wound care for multiple pressure ulcers  9-1-23: here for wound care for multiple pressure ulcers, new one to left posterior ankle  9-8-23: here for wound care for multiple pressure ulcers  9-15-23: here for wound care for pressure ulcers  9-22-23: Here for wound care for pressure ulcers  10-6-23: here for wound care for pressure ulcers, she has 4 new ones  10-13-23: here for wound care for pressure ulcers   10-27-23: here for wound care for pressure ulcers, she has 2 new ones  11-3-23: here for wound care for pressure ulcers  11-10-23; here for wound care for pressure ulcers  11-17-23: here for wound care for multiple pressure ulcers  12-1-23: here for wound care for multiple pressure ulcers and a non thermal blister to left foot.  12-15-23: here for wound care for multiple pressure ulcers  12-22-23: here for wound care for multiple pressure ulcers  12-29-23: here for wound care for multiple pressure ulcers including 2 new areas.  1-12-24: here for wound care for multiple pressure ulcers, one new ulcer today  1-19-24: here for wound care for multiple pressure ulcers  1-26-24: here for wound care for multiple pressure ulcers         Stage III pressure ulcer of right ankle    Stage III pressure ulcer of right hip    Stage III pressure ulcer of left ankle     Physical Exam  Vitals reviewed.   Constitutional:       Appearance: Normal appearance.   HENT:      Head: Normocephalic.   Pulmonary:      Effort: Pulmonary effort is normal.   Musculoskeletal:      Comments: Pt in a wheelchair d/t spina bifida   Skin:     General: Skin is warm and dry.      Comments: Stage 4  pressure ulcer to left ischium with adipose exposed, stage 3 pressure  ulcer to  right hip with adipose exposed, Stage 3 pressure ulcer to left anterior ankle with adipose exposed. stage 3 pressure ulcer to right anterior ankle with adipose exposed. Stage 3 pressure ulcer to right heel with adipose exposed.     Neurological:      Mental Status: She is alert and oriented to person, place, and time. Mental status is at baseline.   Psychiatric:         Mood and Affect: Mood normal.         Behavior: Behavior normal.         Thought Content: Thought content normal.         Judgment: Judgment normal.     Ulcers debrided, all are improving, will continue santyl daily and cover with xeroform guaze to left and right anterior ankles, silver alginate daily to others, keep clean and dry, stool in left ischial ulcer at every visit. Will recheck in one week. Pt still not taking keflex as ordered and has not notified Dr. Moffett, has an appt on 2-16-24. Her potential to heal is fair.  See wound doc progress notes. Documents will be scanned.        Hyacinth CastleBoeuf  [facility]

## 2024-02-02 ENCOUNTER — OFFICE VISIT (OUTPATIENT)
Dept: WOUND CARE | Facility: HOSPITAL | Age: 39
End: 2024-02-02
Attending: NURSE PRACTITIONER
Payer: MEDICAID

## 2024-02-02 VITALS
HEART RATE: 80 BPM | RESPIRATION RATE: 18 BRPM | SYSTOLIC BLOOD PRESSURE: 125 MMHG | DIASTOLIC BLOOD PRESSURE: 75 MMHG | TEMPERATURE: 98 F

## 2024-02-02 DIAGNOSIS — L89.213 STAGE III PRESSURE ULCER OF RIGHT HIP: ICD-10-CM

## 2024-02-02 DIAGNOSIS — L89.513 STAGE III PRESSURE ULCER OF RIGHT ANKLE: ICD-10-CM

## 2024-02-02 DIAGNOSIS — L89.613 STAGE III PRESSURE ULCER OF RIGHT HEEL: ICD-10-CM

## 2024-02-02 DIAGNOSIS — L89.523 STAGE III PRESSURE ULCER OF LEFT ANKLE: ICD-10-CM

## 2024-02-02 DIAGNOSIS — L89.893 PRESSURE INJURY OF LEFT FOOT, STAGE 3: ICD-10-CM

## 2024-02-02 DIAGNOSIS — L89.893 PRESSURE INJURY OF RIGHT FOOT, STAGE 3: ICD-10-CM

## 2024-02-02 DIAGNOSIS — L89.894 PRESSURE INJURY OF TOE OF RIGHT FOOT, STAGE 4: ICD-10-CM

## 2024-02-02 DIAGNOSIS — L89.324 LEFT ISCHIAL PRESSURE SORE, STAGE IV: Primary | ICD-10-CM

## 2024-02-02 PROCEDURE — 11042 DBRDMT SUBQ TIS 1ST 20SQCM/<: CPT

## 2024-02-02 NOTE — PROGRESS NOTES
[facility]  Wound Care  Progress Note    Problem List Items Addressed This Visit          Orthopedic    Stage III pressure ulcer of right heel    Overview     HPI: 34 yr old female with history of hydrocephalus, in a wheelchair. Developed a stage 3 pressure ulcer to right heel due to lack of sensation    Location: right heel     Duration: 9-14-18     Context: heel is  Pressure     Associated Signs & Symptoms: denies pain    Timing: n/a    Severity: n/a    Quality: n/a    Modifying Factors: n/a    10-26-18: Here for wound care to right medial heel and right 4th toe wound  11-9-18: here for wound care to right medial heel and right 4th toe wounds  11-16-18: here for wound care for right medial heel wound  11-30-18: here for wound care for right medial heel pressure ulcer.  12-7-18: here for wound care for right medial heel pressure ulcer.  12-14-18: here for wound care to right medial heel  12-21-18:  Here for wound care to right medial heel.  12-28-18: here for wound care to right medial heel pressure ulcer  1-4-19: here for wound care to right medial heel pressure ulcer  1-11-19: here for wound care to right medial heel pressure ulcer and a neuropathic ulcer to her right great toe that has been there for over a month.  1-18-19: here for wound care to right medial pressure ulcer and right great toe neuropathic ulcer  1-25-19: here for wound care for right medial heel stage 3 pressure ulcer and right great toe neuropathic ulcer  2-1-19: here for wound care for right medial heel stage 3 pressure ulcer and neuropathic ulcer to right great toe.  2-8-19: here for wound care for right medial heel stage 3 pressure ulcer  2-15-19: here for wound care for right heel pressure ulcer  2-22-19: here for wound care for right heel pressure ulcer  3-1-19: here for wound care for right heel pressure ulcer  3-8-19: here for wound care for right heel pressure ulcer  3-15-19: here for wound care for right stage 3  pressure ulcer  3-22-19: here for wound care for stage 3 pressure ulcer to her right heel  3-29-19: here for wound care for stage 3 pressure ulcer to right heel  4-5-19: here for wound care for stage 3 pressure ulcer to right heel.  4-26-19: here for wound care for stage 3 pressure ulcer to right heel and a new neuropathic ulcer to right great toe.  5-3-19: here for wound care for stage 3 pressure ulcer to right heel and a  neuropathic ulcer to right great toe.  5-17-19: here for wound care for stage 3 pressure ulcer to right heel, right toe is healed today  5-24-19: here for wound care for stage 3 pressure ulcer to right heel  5-31-19: here for wound care stage 3 pressure ulcer to right heel  6-14-19: here for wound care for stage 3 pressure ulcer to her right heel.  6-21-19: here for wound care for stage 3 pressure ulcer to right heel         Pressure injury of right foot, stage 3    Left ischial pressure sore, stage IV - Primary    Overview     HPI: 39 yr old female with history of spina bifida, does not walk, was seen in the wound clinic years ago but recently hospitalized in [place] for multiple pressure ulcers, she presents today for wound care.    Location:  left ischium, right hip, left anterior ankle,  left lateral 5th toe, left dorsal 3rd toe, right anterior lateral ankle, right heel    Duration:  left ischium: 3-27-23, right hip; 7-8-23, left lateral 5th toe: 11-13-23,  left anterior ankle: 12-9-23, left dorsal 3rd toe: 12-26-23, right anterior ankle: 12-25-23, right heel: 1-8-24      Context: all are pressure    Associated Signs & Symptoms: denies pain    Timing: n/a    Severity: n/a    Quality: n/a    Modifying Factors: n/a    5-12-23; here for wound care for multiple pressure ulcers. She has several new ones to her buttocks.  5-19-23: here for wound care for multiple pressure ulcers   6-2-23: here for wound care for multiple pressure ulcers.  6-9-23: here for wound care for multiple pressure  ulcers  6-15-23: here for wound care for multiple pressure ulcers  6-23-23: here for wound care for pressure ulcers  6-30-23: here for wound care for pressure ulcers.  7-14-23: here for wound care for pressure ulcers  7-21-23: here for wound care for pressure ulcers  8-4-23: here for wound care for multiple pressure ulcers.  8-11-23: here for wound care for multiple pressure ulcers.  8-18-23: here for wound care for multiple pressure ulcers  9-1-23: here for wound care for multiple pressure ulcers, new one to left posterior ankle  9-8-23: here for wound care for multiple pressure ulcers  9-15-23: here for wound care for pressure ulcers  9-22-23: Here for wound care for pressure ulcers  10-6-23: here for wound care for pressure ulcers, she has 4 new ones  10-13-23: here for wound care for pressure ulcers   10-27-23: here for wound care for pressure ulcers, she has 2 new ones  11-3-23: here for wound care for pressure ulcers  11-10-23; here for wound care for pressure ulcers  11-17-23: here for wound care for multiple pressure ulcers  12-1-23: here for wound care for multiple pressure ulcers and a non thermal blister to left foot.  12-15-23: here for wound care for multiple pressure ulcers  12-22-23: here for wound care for multiple pressure ulcers  12-29-23: here for wound care for multiple pressure ulcers including 2 new areas.  1-12-24: here for wound care for multiple pressure ulcers, one new ulcer today  1-19-24: here for wound care for multiple pressure ulcers  1-26-24: here for wound care for multiple pressure ulcers  2-2-24: here for wound care for multiple pressure ulcers.         Stage III pressure ulcer of right ankle    Stage III pressure ulcer of right hip    Stage III pressure ulcer of left ankle    Pressure injury of left foot, stage 3    Pressure injury of toe of right foot, stage 4   Physical Exam  Vitals reviewed.   Constitutional:       Appearance: Normal appearance.   HENT:      Head:  Normocephalic.   Pulmonary:      Effort: Pulmonary effort is normal.   Musculoskeletal:         General: Normal range of motion.      Comments: In a wheelchair d/t spina bifida   Skin:     General: Skin is warm and dry.      Comments: Stage 4 pressure ulcer to left ischium with adipose exposed, stage 3 pressure  ulcer to  right hip with adipose exposed, Stage 3 pressure ulcer to left anterior ankle with adipose exposed. stage 3 pressure ulcer to right anterior ankle with adipose exposed. Stage 3 pressure ulcer to right heel with adipose exposed.    Neurological:      Mental Status: She is alert and oriented to person, place, and time.   Psychiatric:         Mood and Affect: Mood normal.         Behavior: Behavior normal.         Thought Content: Thought content normal.         Judgment: Judgment normal.       Ulcers debrided, all are doing better, pt will not be coming to clinic for at least 6 weeks d/t caregiver having surgery. Will continue silver alginate qod to all except right anterior ankle will use santyl daily and cover with xeroform guaze, keep clean and dry.   See wound doc progress notes. Documents will be scanned.        Hyacinth LeBoeuf [facility]

## 2024-06-02 ENCOUNTER — LAB VISIT (OUTPATIENT)
Dept: LAB | Facility: HOSPITAL | Age: 39
End: 2024-06-02
Attending: INTERNAL MEDICINE
Payer: MEDICAID

## 2024-06-02 DIAGNOSIS — D64.9 ANEMIA, UNSPECIFIED: Primary | ICD-10-CM

## 2024-06-02 DIAGNOSIS — D64.9 ANEMIA, UNSPECIFIED: ICD-10-CM

## 2024-06-02 LAB
BACTERIA #/AREA URNS HPF: NEGATIVE /HPF
BILIRUB UR QL STRIP: NEGATIVE
CLARITY UR: ABNORMAL
COLOR UR: ABNORMAL
GLUCOSE UR QL STRIP: NEGATIVE
HGB UR QL STRIP: NEGATIVE
HYALINE CASTS #/AREA URNS LPF: 0 /LPF
KETONES UR QL STRIP: NEGATIVE
LEUKOCYTE ESTERASE UR QL STRIP: ABNORMAL
MICROSCOPIC COMMENT: ABNORMAL
NITRITE UR QL STRIP: NEGATIVE
PH UR STRIP: >8 [PH] (ref 5–8)
PROT UR QL STRIP: ABNORMAL
RBC #/AREA URNS HPF: 1 /HPF (ref 0–4)
SP GR UR STRIP: 1.01 (ref 1–1.03)
SQUAMOUS #/AREA URNS HPF: 0 /HPF
URN SPEC COLLECT METH UR: ABNORMAL
UROBILINOGEN UR STRIP-ACNC: 1 EU/DL
WBC #/AREA URNS HPF: 0 /HPF (ref 0–5)

## 2024-06-02 PROCEDURE — 81000 URINALYSIS NONAUTO W/SCOPE: CPT | Performed by: INTERNAL MEDICINE

## 2024-07-01 RX ORDER — CIPROFLOXACIN 500 MG/1
500 TABLET ORAL 2 TIMES DAILY
Qty: 10 TABLET | Refills: 0 | Status: SHIPPED | OUTPATIENT
Start: 2024-07-01 | End: 2024-07-06

## 2025-04-08 ENCOUNTER — OFFICE VISIT (OUTPATIENT)
Dept: SURGERY | Facility: CLINIC | Age: 40
End: 2025-04-08
Payer: MEDICAID

## 2025-04-08 DIAGNOSIS — T81.31XA POSTOPERATIVE WOUND DEHISCENCE, INITIAL ENCOUNTER: Primary | ICD-10-CM

## 2025-04-08 PROCEDURE — 99499 UNLISTED E&M SERVICE: CPT | Mod: S$PBB,,, | Performed by: SURGERY

## 2025-04-08 PROCEDURE — 99211 OFF/OP EST MAY X REQ PHY/QHP: CPT | Mod: PBBFAC | Performed by: SURGERY

## 2025-04-08 PROCEDURE — 99999 PR PBB SHADOW E&M-EST. PATIENT-LVL I: CPT | Mod: PBBFAC,,, | Performed by: SURGERY

## 2025-04-08 NOTE — PROGRESS NOTES
Patient had ex lap with repair of incarcerated ventral hernia without mesh.  This was about a month ago at an outside hospital.  She developed postoperative wound dehiscence of the superior aspect of her laparotomy.  It is currently almost closed now.  Only has a small little slit with superficial dehiscence.  I would just continue to cover with gauze and tape.  Should heal and close up on its own.  No follow up needed.

## 2025-05-22 ENCOUNTER — ON-DEMAND VIRTUAL (OUTPATIENT)
Dept: URGENT CARE | Facility: CLINIC | Age: 40
End: 2025-05-22
Payer: MEDICAID

## 2025-05-22 DIAGNOSIS — Z87.442 HISTORY OF RENAL STONE: ICD-10-CM

## 2025-05-22 DIAGNOSIS — R10.9 ACUTE RIGHT FLANK PAIN: Primary | ICD-10-CM

## 2025-05-22 NOTE — PROGRESS NOTES
Subjective:      Patient ID: Komal Georges is a 40 y.o. female.    Vitals:  vitals were not taken for this visit.     Chief Complaint: Flank Pain      Visit Type: TELE AUDIOVISUAL    Patient Location: [facility]     Present with the patient at the time of consultation: TELEMED PRESENT WITH PATIENT: None      Past Medical History:   Diagnosis Date    Depression     GERD (gastroesophageal reflux disease)     Hydrocephalus      Past Surgical History:   Procedure Laterality Date    BLADDER AUGMENTATION      CARDIAC SURGERY      CHOLECYSTECTOMY      JOINT REPLACEMENT      SHUNT REVISION       Review of patient's allergies indicates:   Allergen Reactions    Amoxicillin Nausea And Vomiting    Penicillins Nausea And Vomiting     Medications Ordered Prior to Encounter[1]  Family History   Problem Relation Name Age of Onset    No Known Problems Mother      No Known Problems Father             [facility] Peq Odvv Intake    5/22/2025  3:18 PM CDT - Filed by Patient   What is your current physical address in the event of a medical emergency?    Are you able to take your vital signs? No   Please attach any relevant images or files    Is your employer contracted with [facility]? No         Pt presents with c/o right flank pain x 1 day, with associated nausea/vomiting.   Endorses hx of kidney stones  Denies fever, CP, urgency, frequency, SOB, or CP.  Seeking advice.           Cardiovascular:  Negative for chest pain.   Respiratory:  Negative for shortness of breath.    Gastrointestinal:  Positive for nausea and vomiting.   Genitourinary:  Positive for flank pain. Negative for dysuria, frequency and urgency.   Musculoskeletal:  Positive for back pain.   Neurological:  Negative for dizziness.        Objective:   The physical exam was conducted virtually.  Physical Exam   Constitutional: She is oriented to person, place, and time. No distress.   HENT:   Head: Normocephalic.   Ears:   Right Ear: External ear  normal.   Left Ear: External ear normal.   Nose: No rhinorrhea or congestion.   Eyes: Conjunctivae are normal.   Neck: Neck supple.   Pulmonary/Chest: Effort normal. No respiratory distress.   Neurological: She is alert and oriented to person, place, and time.   Skin: Skin is no rash.       Assessment:     1. Acute right flank pain    2. History of renal stone        Plan:   DDX : kidney stones, pyelonephritis, UTI    Recommend in person evaluation and possible imaging.     Acute right flank pain    History of renal stone    We appreciate you trusting us with your medical care. We hope you feel better soon. We will be happy to take care of you for all of your future medical needs.     You must understand that you've received Virtual treatment only and that you may be released before all your medical problems are known or treated. You, the patient, will arrange for follow up care as instructed.                      [1]   Current Outpatient Medications on File Prior to Visit   Medication Sig Dispense Refill    ALPRAZolam (XANAX) 0.5 MG tablet       cetirizine (ZYRTEC) 10 MG tablet 1 tablet Orally Once a day      gentamicin (GARAMYCIN) 0.1 % ointment APPLY ONCE A DAY TO AFFECTED AREA      melatonin 10 mg Tab       mupirocin (BACTROBAN) 2 % ointment       ondansetron (ZOFRAN-ODT) 4 MG TbDL Take 1 tablet (4 mg total) by mouth every 8 (eight) hours as needed (nausea). 20 tablet 0    OXcarbazepine (TRILEPTAL) 150 MG Tab 1 tablet Orally Twice a day for 30 days      pantoprazole (PROTONIX) 40 MG tablet Take 40 mg by mouth once daily.      prazosin (MINIPRESS) 1 MG Cap 1 capsule at bedtime for nightmares Orally Once a day for 30 days      promethazine (PHENERGAN) 25 MG tablet SMARTSI Tablet(s) Gastro Tube Every 6 Hours PRN      propranoloL (INDERAL) 10 MG tablet       QUEtiapine (SEROQUEL) 200 MG Tab       sertraline (ZOLOFT) 100 MG tablet Take 150 mg by mouth once daily.      syringe with needle, safety 3 mL 22 gauge x 1  "1/2" Syrg For IM injection 4 each 0    trimethoprim (TRIMPEX) 100 mg Tab Take 1 tablet (100 mg total) by mouth once daily. To prevent UTI. 30 tablet 5    VITAMIN D2 1,250 mcg (50,000 unit) capsule Take 50,000 Units by mouth every 7 days.      VRAYLAR 3 mg Cap 1 capsule at bedtime Orally Once a day for 30 days       No current facility-administered medications on file prior to visit.     "